# Patient Record
Sex: MALE | Race: WHITE | Employment: FULL TIME | URBAN - NONMETROPOLITAN AREA
[De-identification: names, ages, dates, MRNs, and addresses within clinical notes are randomized per-mention and may not be internally consistent; named-entity substitution may affect disease eponyms.]

---

## 2021-01-22 ENCOUNTER — APPOINTMENT (OUTPATIENT)
Dept: ULTRASOUND IMAGING | Age: 26
End: 2021-01-22
Payer: COMMERCIAL

## 2021-01-22 ENCOUNTER — HOSPITAL ENCOUNTER (OUTPATIENT)
Age: 26
Setting detail: OBSERVATION
Discharge: HOME | End: 2021-01-24
Attending: EMERGENCY MEDICINE | Admitting: FAMILY MEDICINE
Payer: COMMERCIAL

## 2021-01-22 ENCOUNTER — APPOINTMENT (OUTPATIENT)
Dept: CT IMAGING | Age: 26
End: 2021-01-22
Payer: COMMERCIAL

## 2021-01-22 DIAGNOSIS — K81.9 CHOLECYSTITIS: ICD-10-CM

## 2021-01-22 DIAGNOSIS — R10.84 GENERALIZED ABDOMINAL PAIN: ICD-10-CM

## 2021-01-22 DIAGNOSIS — K80.50 BILIARY COLIC: Primary | ICD-10-CM

## 2021-01-22 DIAGNOSIS — K80.00 CALCULUS OF GALLBLADDER WITH ACUTE CHOLECYSTITIS WITHOUT OBSTRUCTION: ICD-10-CM

## 2021-01-22 PROBLEM — R10.9 ABDOMINAL PAIN: Status: ACTIVE | Noted: 2021-01-22

## 2021-01-22 LAB
ALBUMIN SERPL-MCNC: 4.2 G/DL (ref 3.5–5.1)
ALP BLD-CCNC: 74 U/L (ref 38–126)
ALT SERPL-CCNC: 35 U/L (ref 11–66)
ANION GAP SERPL CALCULATED.3IONS-SCNC: 9 MEQ/L (ref 8–16)
AST SERPL-CCNC: 27 U/L (ref 5–40)
BACTERIA: ABNORMAL /HPF
BASOPHILS # BLD: 0.2 %
BASOPHILS ABSOLUTE: 0 THOU/MM3 (ref 0–0.1)
BILIRUB SERPL-MCNC: 0.7 MG/DL (ref 0.3–1.2)
BILIRUBIN DIRECT: < 0.2 MG/DL (ref 0–0.3)
BILIRUBIN URINE: NEGATIVE
BLOOD, URINE: NEGATIVE
BUN BLDV-MCNC: 10 MG/DL (ref 7–22)
CALCIUM SERPL-MCNC: 8.7 MG/DL (ref 8.5–10.5)
CASTS 2: ABNORMAL /LPF
CASTS UA: ABNORMAL /LPF
CHARACTER, URINE: CLEAR
CHLORIDE BLD-SCNC: 101 MEQ/L (ref 98–111)
CO2: 26 MEQ/L (ref 23–33)
COLOR: YELLOW
CREAT SERPL-MCNC: 0.7 MG/DL (ref 0.4–1.2)
CRYSTALS, UA: ABNORMAL
EOSINOPHIL # BLD: 0.1 %
EOSINOPHILS ABSOLUTE: 0 THOU/MM3 (ref 0–0.4)
EPITHELIAL CELLS, UA: ABNORMAL /HPF
ERYTHROCYTE [DISTWIDTH] IN BLOOD BY AUTOMATED COUNT: 12.9 % (ref 11.5–14.5)
ERYTHROCYTE [DISTWIDTH] IN BLOOD BY AUTOMATED COUNT: 39.5 FL (ref 35–45)
GFR SERPL CREATININE-BSD FRML MDRD: > 90 ML/MIN/1.73M2
GLUCOSE BLD-MCNC: 90 MG/DL (ref 70–108)
GLUCOSE URINE: NEGATIVE MG/DL
HCT VFR BLD CALC: 47.1 % (ref 42–52)
HEMOGLOBIN: 15.9 GM/DL (ref 14–18)
IMMATURE GRANS (ABS): 0.12 THOU/MM3 (ref 0–0.07)
IMMATURE GRANULOCYTES: 0.6 %
INR BLD: 1.17 (ref 0.85–1.13)
KETONES, URINE: NEGATIVE
LACTIC ACID: 0.8 MMOL/L (ref 0.5–2.2)
LEUKOCYTE ESTERASE, URINE: NEGATIVE
LIPASE: 17 U/L (ref 5.6–51.3)
LYMPHOCYTES # BLD: 8.9 %
LYMPHOCYTES ABSOLUTE: 1.9 THOU/MM3 (ref 1–4.8)
MCH RBC QN AUTO: 28.9 PG (ref 26–33)
MCHC RBC AUTO-ENTMCNC: 33.8 GM/DL (ref 32.2–35.5)
MCV RBC AUTO: 85.5 FL (ref 80–94)
MISCELLANEOUS 2: ABNORMAL
MONOCYTES # BLD: 7.1 %
MONOCYTES ABSOLUTE: 1.5 THOU/MM3 (ref 0.4–1.3)
NITRITE, URINE: NEGATIVE
NUCLEATED RED BLOOD CELLS: 0 /100 WBC
OSMOLALITY CALCULATION: 270.5 MOSMOL/KG (ref 275–300)
PH UA: 8.5 (ref 5–9)
PLATELET # BLD: 224 THOU/MM3 (ref 130–400)
PMV BLD AUTO: 10.1 FL (ref 9.4–12.4)
POTASSIUM SERPL-SCNC: 3.9 MEQ/L (ref 3.5–5.2)
PROCALCITONIN: 0.14 NG/ML (ref 0.01–0.09)
PROTEIN UA: ABNORMAL
RBC # BLD: 5.51 MILL/MM3 (ref 4.7–6.1)
RBC URINE: ABNORMAL /HPF
REASON FOR REJECTION: NORMAL
REJECTED TEST: NORMAL
RENAL EPITHELIAL, UA: ABNORMAL
SEG NEUTROPHILS: 83.1 %
SEGMENTED NEUTROPHILS ABSOLUTE COUNT: 17.6 THOU/MM3 (ref 1.8–7.7)
SODIUM BLD-SCNC: 136 MEQ/L (ref 135–145)
SPECIFIC GRAVITY, URINE: 1.02 (ref 1–1.03)
TOTAL PROTEIN: 6.9 G/DL (ref 6.1–8)
UROBILINOGEN, URINE: 1 EU/DL (ref 0–1)
WBC # BLD: 21.2 THOU/MM3 (ref 4.8–10.8)
WBC UA: ABNORMAL /HPF
YEAST: ABNORMAL

## 2021-01-22 PROCEDURE — 80053 COMPREHEN METABOLIC PANEL: CPT

## 2021-01-22 PROCEDURE — 6360000002 HC RX W HCPCS: Performed by: FAMILY MEDICINE

## 2021-01-22 PROCEDURE — 3209999900 CT INTERPRETATION OF OUTSIDE IMAGES

## 2021-01-22 PROCEDURE — 6370000000 HC RX 637 (ALT 250 FOR IP): Performed by: EMERGENCY MEDICINE

## 2021-01-22 PROCEDURE — 83690 ASSAY OF LIPASE: CPT

## 2021-01-22 PROCEDURE — 85025 COMPLETE CBC W/AUTO DIFF WBC: CPT

## 2021-01-22 PROCEDURE — G0378 HOSPITAL OBSERVATION PER HR: HCPCS

## 2021-01-22 PROCEDURE — 76705 ECHO EXAM OF ABDOMEN: CPT

## 2021-01-22 PROCEDURE — 96375 TX/PRO/DX INJ NEW DRUG ADDON: CPT

## 2021-01-22 PROCEDURE — 84145 PROCALCITONIN (PCT): CPT

## 2021-01-22 PROCEDURE — 96376 TX/PRO/DX INJ SAME DRUG ADON: CPT

## 2021-01-22 PROCEDURE — 81001 URINALYSIS AUTO W/SCOPE: CPT

## 2021-01-22 PROCEDURE — 96374 THER/PROPH/DIAG INJ IV PUSH: CPT

## 2021-01-22 PROCEDURE — 36415 COLL VENOUS BLD VENIPUNCTURE: CPT

## 2021-01-22 PROCEDURE — 99220 PR INITIAL OBSERVATION CARE/DAY 70 MINUTES: CPT | Performed by: FAMILY MEDICINE

## 2021-01-22 PROCEDURE — 2580000003 HC RX 258: Performed by: FAMILY MEDICINE

## 2021-01-22 PROCEDURE — 2580000003 HC RX 258: Performed by: EMERGENCY MEDICINE

## 2021-01-22 PROCEDURE — 99284 EMERGENCY DEPT VISIT MOD MDM: CPT

## 2021-01-22 PROCEDURE — 83605 ASSAY OF LACTIC ACID: CPT

## 2021-01-22 PROCEDURE — 82248 BILIRUBIN DIRECT: CPT

## 2021-01-22 PROCEDURE — 96365 THER/PROPH/DIAG IV INF INIT: CPT

## 2021-01-22 PROCEDURE — 6360000002 HC RX W HCPCS: Performed by: EMERGENCY MEDICINE

## 2021-01-22 PROCEDURE — 85610 PROTHROMBIN TIME: CPT

## 2021-01-22 PROCEDURE — 87040 BLOOD CULTURE FOR BACTERIA: CPT

## 2021-01-22 RX ORDER — CEFAZOLIN SODIUM 1 G/50ML
1000 INJECTION, SOLUTION INTRAVENOUS EVERY 8 HOURS
Status: DISCONTINUED | OUTPATIENT
Start: 2021-01-22 | End: 2021-01-23

## 2021-01-22 RX ORDER — MORPHINE SULFATE 4 MG/ML
4 INJECTION, SOLUTION INTRAMUSCULAR; INTRAVENOUS ONCE
Status: COMPLETED | OUTPATIENT
Start: 2021-01-22 | End: 2021-01-22

## 2021-01-22 RX ORDER — SODIUM CHLORIDE 0.9 % (FLUSH) 0.9 %
10 SYRINGE (ML) INJECTION EVERY 12 HOURS SCHEDULED
Status: DISCONTINUED | OUTPATIENT
Start: 2021-01-22 | End: 2021-01-24 | Stop reason: HOSPADM

## 2021-01-22 RX ORDER — TRAZODONE HYDROCHLORIDE 50 MG/1
50 TABLET ORAL NIGHTLY
COMMUNITY

## 2021-01-22 RX ORDER — ESOMEPRAZOLE MAGNESIUM 40 MG/1
40 FOR SUSPENSION ORAL DAILY
COMMUNITY

## 2021-01-22 RX ORDER — ESCITALOPRAM OXALATE 20 MG/1
10 TABLET ORAL DAILY
COMMUNITY

## 2021-01-22 RX ORDER — 0.9 % SODIUM CHLORIDE 0.9 %
500 INTRAVENOUS SOLUTION INTRAVENOUS ONCE
Status: COMPLETED | OUTPATIENT
Start: 2021-01-22 | End: 2021-01-22

## 2021-01-22 RX ORDER — PANTOPRAZOLE SODIUM 40 MG/10ML
40 INJECTION, POWDER, LYOPHILIZED, FOR SOLUTION INTRAVENOUS DAILY
Status: DISCONTINUED | OUTPATIENT
Start: 2021-01-23 | End: 2021-01-22

## 2021-01-22 RX ORDER — ACETAMINOPHEN 325 MG/1
650 TABLET ORAL EVERY 4 HOURS PRN
Status: DISCONTINUED | OUTPATIENT
Start: 2021-01-22 | End: 2021-01-24 | Stop reason: HOSPADM

## 2021-01-22 RX ORDER — MORPHINE SULFATE 2 MG/ML
2 INJECTION, SOLUTION INTRAMUSCULAR; INTRAVENOUS EVERY 4 HOURS PRN
Status: DISCONTINUED | OUTPATIENT
Start: 2021-01-22 | End: 2021-01-23

## 2021-01-22 RX ORDER — PANTOPRAZOLE SODIUM 40 MG/1
40 TABLET, DELAYED RELEASE ORAL
Status: DISCONTINUED | OUTPATIENT
Start: 2021-01-23 | End: 2021-01-24 | Stop reason: HOSPADM

## 2021-01-22 RX ORDER — BUPROPION HYDROCHLORIDE 150 MG/1
150 TABLET ORAL EVERY MORNING
COMMUNITY

## 2021-01-22 RX ORDER — BUPROPION HYDROCHLORIDE 150 MG/1
150 TABLET ORAL EVERY MORNING
Status: DISCONTINUED | OUTPATIENT
Start: 2021-01-23 | End: 2021-01-24 | Stop reason: HOSPADM

## 2021-01-22 RX ORDER — SODIUM CHLORIDE 9 MG/ML
INJECTION, SOLUTION INTRAVENOUS CONTINUOUS
Status: DISCONTINUED | OUTPATIENT
Start: 2021-01-22 | End: 2021-01-24 | Stop reason: HOSPADM

## 2021-01-22 RX ORDER — SODIUM CHLORIDE 0.9 % (FLUSH) 0.9 %
10 SYRINGE (ML) INJECTION PRN
Status: DISCONTINUED | OUTPATIENT
Start: 2021-01-22 | End: 2021-01-22

## 2021-01-22 RX ORDER — SODIUM CHLORIDE 0.9 % (FLUSH) 0.9 %
10 SYRINGE (ML) INJECTION PRN
Status: DISCONTINUED | OUTPATIENT
Start: 2021-01-22 | End: 2021-01-24 | Stop reason: HOSPADM

## 2021-01-22 RX ORDER — MORPHINE SULFATE 4 MG/ML
4 INJECTION, SOLUTION INTRAMUSCULAR; INTRAVENOUS EVERY 4 HOURS PRN
Status: DISCONTINUED | OUTPATIENT
Start: 2021-01-22 | End: 2021-01-23

## 2021-01-22 RX ORDER — TRAZODONE HYDROCHLORIDE 50 MG/1
50 TABLET ORAL NIGHTLY
Status: DISCONTINUED | OUTPATIENT
Start: 2021-01-22 | End: 2021-01-24 | Stop reason: HOSPADM

## 2021-01-22 RX ORDER — ONDANSETRON 2 MG/ML
4 INJECTION INTRAMUSCULAR; INTRAVENOUS ONCE
Status: COMPLETED | OUTPATIENT
Start: 2021-01-22 | End: 2021-01-22

## 2021-01-22 RX ORDER — ONDANSETRON 2 MG/ML
4 INJECTION INTRAMUSCULAR; INTRAVENOUS EVERY 6 HOURS PRN
Status: DISCONTINUED | OUTPATIENT
Start: 2021-01-22 | End: 2021-01-24 | Stop reason: HOSPADM

## 2021-01-22 RX ORDER — ESCITALOPRAM OXALATE 10 MG/1
10 TABLET ORAL DAILY
Status: DISCONTINUED | OUTPATIENT
Start: 2021-01-23 | End: 2021-01-24 | Stop reason: HOSPADM

## 2021-01-22 RX ORDER — 0.9 % SODIUM CHLORIDE 0.9 %
1000 INTRAVENOUS SOLUTION INTRAVENOUS ONCE
Status: COMPLETED | OUTPATIENT
Start: 2021-01-22 | End: 2021-01-23

## 2021-01-22 RX ORDER — PANTOPRAZOLE SODIUM 40 MG/10ML
40 INJECTION, POWDER, LYOPHILIZED, FOR SOLUTION INTRAVENOUS DAILY
Status: DISCONTINUED | OUTPATIENT
Start: 2021-01-23 | End: 2021-01-22 | Stop reason: SDUPTHER

## 2021-01-22 RX ORDER — PANTOPRAZOLE SODIUM 40 MG/10ML
40 INJECTION, POWDER, LYOPHILIZED, FOR SOLUTION INTRAVENOUS ONCE
Status: DISCONTINUED | OUTPATIENT
Start: 2021-01-22 | End: 2021-01-22

## 2021-01-22 RX ORDER — SODIUM CHLORIDE 0.9 % (FLUSH) 0.9 %
10 SYRINGE (ML) INJECTION EVERY 12 HOURS SCHEDULED
Status: DISCONTINUED | OUTPATIENT
Start: 2021-01-22 | End: 2021-01-22

## 2021-01-22 RX ORDER — PANTOPRAZOLE SODIUM 40 MG/1
40 TABLET, DELAYED RELEASE ORAL ONCE
Status: COMPLETED | OUTPATIENT
Start: 2021-01-22 | End: 2021-01-22

## 2021-01-22 RX ORDER — PROMETHAZINE HYDROCHLORIDE 25 MG/1
12.5 TABLET ORAL EVERY 6 HOURS PRN
Status: DISCONTINUED | OUTPATIENT
Start: 2021-01-22 | End: 2021-01-24 | Stop reason: HOSPADM

## 2021-01-22 RX ADMIN — CEFAZOLIN SODIUM 1000 MG: 1 INJECTION, SOLUTION INTRAVENOUS at 22:55

## 2021-01-22 RX ADMIN — SODIUM CHLORIDE 1000 ML: 9 INJECTION, SOLUTION INTRAVENOUS at 22:54

## 2021-01-22 RX ADMIN — SODIUM CHLORIDE, PRESERVATIVE FREE 10 ML: 5 INJECTION INTRAVENOUS at 22:56

## 2021-01-22 RX ADMIN — MORPHINE SULFATE 2 MG: 2 INJECTION, SOLUTION INTRAMUSCULAR; INTRAVENOUS at 22:55

## 2021-01-22 RX ADMIN — MORPHINE SULFATE 4 MG: 4 INJECTION, SOLUTION INTRAMUSCULAR; INTRAVENOUS at 18:40

## 2021-01-22 RX ADMIN — PANTOPRAZOLE SODIUM 40 MG: 40 TABLET, DELAYED RELEASE ORAL at 18:22

## 2021-01-22 RX ADMIN — SODIUM CHLORIDE: 9 INJECTION, SOLUTION INTRAVENOUS at 18:22

## 2021-01-22 RX ADMIN — SODIUM CHLORIDE 500 ML: 9 INJECTION, SOLUTION INTRAVENOUS at 17:32

## 2021-01-22 RX ADMIN — ONDANSETRON 4 MG: 2 INJECTION INTRAMUSCULAR; INTRAVENOUS at 17:32

## 2021-01-22 ASSESSMENT — PAIN DESCRIPTION - FREQUENCY: FREQUENCY: CONTINUOUS

## 2021-01-22 ASSESSMENT — PAIN DESCRIPTION - ORIENTATION
ORIENTATION: LOWER
ORIENTATION: LOWER

## 2021-01-22 ASSESSMENT — ENCOUNTER SYMPTOMS
RHINORRHEA: 0
BACK PAIN: 0
COUGH: 0
WHEEZING: 0
NAUSEA: 1
TROUBLE SWALLOWING: 0
VOICE CHANGE: 0
CONSTIPATION: 0
CHEST TIGHTNESS: 0
SINUS PRESSURE: 0
VOMITING: 1
DIARRHEA: 0
SHORTNESS OF BREATH: 0
ABDOMINAL PAIN: 1
SORE THROAT: 0

## 2021-01-22 ASSESSMENT — PAIN DESCRIPTION - PAIN TYPE
TYPE: ACUTE PAIN
TYPE: ACUTE PAIN

## 2021-01-22 ASSESSMENT — PAIN SCALES - GENERAL
PAINLEVEL_OUTOF10: 6
PAINLEVEL_OUTOF10: 8

## 2021-01-22 NOTE — ED PROVIDER NOTES
[facility]        CHIEF COMPLAINT    Chief Complaint   Patient presents with    Abdominal Pain       Nurses Notes reviewed and I agree except as noted in the HPI. HPI    Cheryle Nasuti is a 30 y.o. male who presents for evaluation of gastric and upper abdominal pain since last night. The patient went to [facility] urgency room and was evaluated had a series of tests including CT scan however he continued to have pain epigastric area and went to her his family physician who was concerned about biliary colic and was sent here to be evaluated. Denies any fever, no chills, no nausea however has been vomiting every 5 minutes. Patient also had diarrhea last night 1 time. Patient admits that she is having this problem since March 2020. Denies use of marijuana denies any heartburn or peptic ulcer disease no GERD      REVIEW OF SYSTEMS    Review of Systems   Constitutional: Negative for appetite change, chills, diaphoresis, fatigue and fever. HENT: Negative for congestion, ear discharge, ear pain, postnasal drip, rhinorrhea, sinus pressure, sore throat, trouble swallowing and voice change. Respiratory: Negative for cough, chest tightness, shortness of breath and wheezing. Cardiovascular: Negative for chest pain, palpitations and leg swelling. Gastrointestinal: Positive for abdominal pain, nausea and vomiting. Negative for constipation and diarrhea. Musculoskeletal: Negative for arthralgias, back pain, joint swelling, myalgias, neck pain and neck stiffness. Skin: Negative for rash. Neurological: Negative for dizziness, syncope, weakness, light-headedness, numbness and headaches. PAST MEDICAL HISTORY     has no past medical history on file. SURGICAL HISTORY   has no past surgical history on file.     CURRENT MEDICATIONS    Previous Medications    BUPROPION (WELLBUTRIN XL) 150 MG EXTENDED RELEASE TABLET    Take 150 mg by mouth every morning    ESCITALOPRAM (LEXAPRO) 20 MG TABLET    Take 10 mg by mouth daily    ESOMEPRAZOLE MAGNESIUM (NEXIUM) 40 MG PACK    Take 40 mg by mouth daily    TRAZODONE (DESYREL) 50 MG TABLET    Take 50 mg by mouth nightly       ALLERGIES    is allergic to percocet [oxycodone-acetaminophen]. FAMILY HISTORY    has no family status information on file. family history is not on file. SOCIAL HISTORY     reports that he has never smoked. He has never used smokeless tobacco. He reports that he does not use drugs. PHYSICAL EXAM      INITIAL VITALS: BP (!) 153/83   Pulse 103   Temp 98.3 °F (36.8 °C)   Resp 18   Ht 5' 10\" (1.778 m)   Wt 220 lb (99.8 kg)   SpO2 94%   BMI 31.57 kg/m² Estimated body mass index is 31.57 kg/m² as calculated from the following:    Height as of this encounter: 5' 10\" (1.778 m). Weight as of this encounter: 220 lb (99.8 kg). Physical Exam  Vitals signs reviewed. Constitutional:       Appearance: He is well-developed. HENT:      Head: Normocephalic and atraumatic. Right Ear: External ear normal.      Left Ear: External ear normal.      Nose: Nose normal.   Eyes:      General: No scleral icterus. Conjunctiva/sclera: Conjunctivae normal.      Pupils: Pupils are equal, round, and reactive to light. Neck:      Musculoskeletal: Normal range of motion and neck supple. Thyroid: No thyromegaly. Vascular: No JVD. Cardiovascular:      Rate and Rhythm: Normal rate and regular rhythm. Heart sounds: No murmur. No friction rub. Pulmonary:      Effort: Pulmonary effort is normal.      Breath sounds: Normal breath sounds. No wheezing or rales. Chest:      Chest wall: No tenderness. Abdominal:      General: Bowel sounds are normal.      Palpations: Abdomen is soft. There is no mass. Tenderness: There is abdominal tenderness in the right upper quadrant, epigastric area and left upper quadrant.  There is right CVA tenderness and left CVA tenderness. There is no rebound. Negative signs include Rovsing's sign and McBurney's sign. Lymphadenopathy:      Cervical: No cervical adenopathy. Skin:     Findings: No rash. Neurological:      Mental Status: He is alert and oriented to person, place, and time. Psychiatric:         Behavior: Behavior is cooperative. MEDICAL DECISION MAKING    DIFFERENTIAL DIAGNOSIS:  Upper abdominal pain, biliary colic, gallstone, cholecystitis, GERD gastritis      DIAGNOSTIC RESULTS      RADIOLOGY:  I have reviewed radiologic plain film image(s). The plain films will be read or overread by the radiologist.All other non-plain film images(s) such as CT, Ultrasound and MRI have been read by the radiologist.  Nina Melena    (Results Pending)       LABS:   Labs Reviewed   CBC WITH AUTO DIFFERENTIAL   BASIC METABOLIC PANEL   HEPATIC FUNCTION PANEL   LIPASE   URINE RT REFLEX TO CULTURE     All other unresulted laboratory test above are normal:    Vitals:    Vitals:    01/22/21 1636   BP: (!) 153/83   Pulse: 103   Resp: 18   Temp: 98.3 °F (36.8 °C)   SpO2: 94%   Weight: 220 lb (99.8 kg)   Height: 5' 10\" (1.778 m)       EMERGENCY DEPARTMENT COURSE:    Medications   0.9 % sodium chloride infusion (has no administration in time range)   0.9 % sodium chloride bolus (500 mLs Intravenous New Bag 1/22/21 1732)   pantoprazole (PROTONIX) injection 40 mg (has no administration in time range)   ondansetron (ZOFRAN) injection 4 mg (4 mg Intravenous Given 1/22/21 1732)       The pt was seen and evaluated by me. Within the department, I observed the pt's vitalsigns to be within acceptable range . Laboratory and Radiological studies were requested. Within the department, the pt was treated with IV fluid hydration, Protonix, Zofran. 18:00 PM signed out to Dr Herminia Adame:   None. CONSULTS:  None    PROCEDURES:  None. FINAL IMPRESSION     No diagnosis found.       DISPOSITION/PLAN  PATIENT REFERRED TO:  No

## 2021-01-22 NOTE — ED TRIAGE NOTES
Patient to ED with complaints of mid abdominal pain that has intermittently been going on since last march. Patient states pain began around 11pm last night and has not subsided. Patient states the pain usually doesn't last this long. Patient denies drug or alcohol use. Patient resting on cot, respirations unlabored.  Family at bedside

## 2021-01-23 ENCOUNTER — ANESTHESIA EVENT (OUTPATIENT)
Dept: OPERATING ROOM | Age: 26
End: 2021-01-23
Payer: COMMERCIAL

## 2021-01-23 ENCOUNTER — ANESTHESIA (OUTPATIENT)
Dept: OPERATING ROOM | Age: 26
End: 2021-01-23
Payer: COMMERCIAL

## 2021-01-23 VITALS — SYSTOLIC BLOOD PRESSURE: 119 MMHG | DIASTOLIC BLOOD PRESSURE: 58 MMHG | TEMPERATURE: 99.9 F | OXYGEN SATURATION: 94 %

## 2021-01-23 LAB
ALBUMIN SERPL-MCNC: 3.8 G/DL (ref 3.5–5.1)
ALP BLD-CCNC: 72 U/L (ref 38–126)
ALT SERPL-CCNC: 43 U/L (ref 11–66)
ANION GAP SERPL CALCULATED.3IONS-SCNC: 7 MEQ/L (ref 8–16)
AST SERPL-CCNC: 33 U/L (ref 5–40)
BASOPHILS # BLD: 0.3 %
BASOPHILS ABSOLUTE: 0 THOU/MM3 (ref 0–0.1)
BILIRUB SERPL-MCNC: 0.6 MG/DL (ref 0.3–1.2)
BUN BLDV-MCNC: 9 MG/DL (ref 7–22)
CALCIUM SERPL-MCNC: 8.8 MG/DL (ref 8.5–10.5)
CHLORIDE BLD-SCNC: 102 MEQ/L (ref 98–111)
CO2: 27 MEQ/L (ref 23–33)
CREAT SERPL-MCNC: 1 MG/DL (ref 0.4–1.2)
EOSINOPHIL # BLD: 0.8 %
EOSINOPHILS ABSOLUTE: 0.1 THOU/MM3 (ref 0–0.4)
ERYTHROCYTE [DISTWIDTH] IN BLOOD BY AUTOMATED COUNT: 13 % (ref 11.5–14.5)
ERYTHROCYTE [DISTWIDTH] IN BLOOD BY AUTOMATED COUNT: 41.7 FL (ref 35–45)
GFR SERPL CREATININE-BSD FRML MDRD: 89 ML/MIN/1.73M2
GLUCOSE BLD-MCNC: 108 MG/DL (ref 70–108)
HCT VFR BLD CALC: 42.9 % (ref 42–52)
HEMOGLOBIN: 14.3 GM/DL (ref 14–18)
IMMATURE GRANS (ABS): 0.08 THOU/MM3 (ref 0–0.07)
IMMATURE GRANULOCYTES: 0.5 %
LYMPHOCYTES # BLD: 12.8 %
LYMPHOCYTES ABSOLUTE: 2 THOU/MM3 (ref 1–4.8)
MCH RBC QN AUTO: 29.2 PG (ref 26–33)
MCHC RBC AUTO-ENTMCNC: 33.3 GM/DL (ref 32.2–35.5)
MCV RBC AUTO: 87.7 FL (ref 80–94)
MONOCYTES # BLD: 8.6 %
MONOCYTES ABSOLUTE: 1.4 THOU/MM3 (ref 0.4–1.3)
NUCLEATED RED BLOOD CELLS: 0 /100 WBC
PLATELET # BLD: 183 THOU/MM3 (ref 130–400)
PMV BLD AUTO: 9.9 FL (ref 9.4–12.4)
POTASSIUM REFLEX MAGNESIUM: 4.7 MEQ/L (ref 3.5–5.2)
RBC # BLD: 4.89 MILL/MM3 (ref 4.7–6.1)
SARS-COV-2, NAAT: NOT DETECTED
SEG NEUTROPHILS: 77 %
SEGMENTED NEUTROPHILS ABSOLUTE COUNT: 12.2 THOU/MM3 (ref 1.8–7.7)
SODIUM BLD-SCNC: 136 MEQ/L (ref 135–145)
TOTAL PROTEIN: 6.6 G/DL (ref 6.1–8)
WBC # BLD: 15.8 THOU/MM3 (ref 4.8–10.8)

## 2021-01-23 PROCEDURE — 7100000001 HC PACU RECOVERY - ADDTL 15 MIN: Performed by: SURGERY

## 2021-01-23 PROCEDURE — 2500000003 HC RX 250 WO HCPCS: Performed by: SURGERY

## 2021-01-23 PROCEDURE — 2500000003 HC RX 250 WO HCPCS: Performed by: REGISTERED NURSE

## 2021-01-23 PROCEDURE — 6360000002 HC RX W HCPCS: Performed by: SURGERY

## 2021-01-23 PROCEDURE — 94761 N-INVAS EAR/PLS OXIMETRY MLT: CPT

## 2021-01-23 PROCEDURE — C1894 INTRO/SHEATH, NON-LASER: HCPCS | Performed by: SURGERY

## 2021-01-23 PROCEDURE — 2580000003 HC RX 258: Performed by: FAMILY MEDICINE

## 2021-01-23 PROCEDURE — 2580000003 HC RX 258: Performed by: SURGERY

## 2021-01-23 PROCEDURE — 6360000002 HC RX W HCPCS: Performed by: PHYSICIAN ASSISTANT

## 2021-01-23 PROCEDURE — 7100000000 HC PACU RECOVERY - FIRST 15 MIN: Performed by: SURGERY

## 2021-01-23 PROCEDURE — 47562 LAPAROSCOPIC CHOLECYSTECTOMY: CPT | Performed by: SURGERY

## 2021-01-23 PROCEDURE — 3600000012 HC SURGERY LEVEL 2 ADDTL 15MIN: Performed by: SURGERY

## 2021-01-23 PROCEDURE — 36415 COLL VENOUS BLD VENIPUNCTURE: CPT

## 2021-01-23 PROCEDURE — 6360000002 HC RX W HCPCS: Performed by: FAMILY MEDICINE

## 2021-01-23 PROCEDURE — 3600000002 HC SURGERY LEVEL 2 BASE: Performed by: SURGERY

## 2021-01-23 PROCEDURE — 2720000010 HC SURG SUPPLY STERILE: Performed by: SURGERY

## 2021-01-23 PROCEDURE — 6370000000 HC RX 637 (ALT 250 FOR IP): Performed by: FAMILY MEDICINE

## 2021-01-23 PROCEDURE — 3700000001 HC ADD 15 MINUTES (ANESTHESIA): Performed by: SURGERY

## 2021-01-23 PROCEDURE — 2709999900 HC NON-CHARGEABLE SUPPLY: Performed by: SURGERY

## 2021-01-23 PROCEDURE — 3700000000 HC ANESTHESIA ATTENDED CARE: Performed by: SURGERY

## 2021-01-23 PROCEDURE — 85025 COMPLETE CBC W/AUTO DIFF WBC: CPT

## 2021-01-23 PROCEDURE — 99225 PR SBSQ OBSERVATION CARE/DAY 25 MINUTES: CPT | Performed by: PHYSICIAN ASSISTANT

## 2021-01-23 PROCEDURE — 88304 TISSUE EXAM BY PATHOLOGIST: CPT

## 2021-01-23 PROCEDURE — G0378 HOSPITAL OBSERVATION PER HR: HCPCS

## 2021-01-23 PROCEDURE — 6370000000 HC RX 637 (ALT 250 FOR IP): Performed by: SURGERY

## 2021-01-23 PROCEDURE — 80053 COMPREHEN METABOLIC PANEL: CPT

## 2021-01-23 PROCEDURE — 2580000003 HC RX 258: Performed by: REGISTERED NURSE

## 2021-01-23 PROCEDURE — 6360000002 HC RX W HCPCS: Performed by: REGISTERED NURSE

## 2021-01-23 PROCEDURE — U0002 COVID-19 LAB TEST NON-CDC: HCPCS

## 2021-01-23 PROCEDURE — 99243 OFF/OP CNSLTJ NEW/EST LOW 30: CPT | Performed by: SURGERY

## 2021-01-23 RX ORDER — SODIUM CHLORIDE, SODIUM LACTATE, POTASSIUM CHLORIDE, CALCIUM CHLORIDE 600; 310; 30; 20 MG/100ML; MG/100ML; MG/100ML; MG/100ML
INJECTION, SOLUTION INTRAVENOUS CONTINUOUS PRN
Status: DISCONTINUED | OUTPATIENT
Start: 2021-01-23 | End: 2021-01-23 | Stop reason: SDUPTHER

## 2021-01-23 RX ORDER — PROPOFOL 10 MG/ML
INJECTION, EMULSION INTRAVENOUS PRN
Status: DISCONTINUED | OUTPATIENT
Start: 2021-01-23 | End: 2021-01-23 | Stop reason: SDUPTHER

## 2021-01-23 RX ORDER — LIDOCAINE HCL/PF 100 MG/5ML
SYRINGE (ML) INJECTION PRN
Status: DISCONTINUED | OUTPATIENT
Start: 2021-01-23 | End: 2021-01-23 | Stop reason: SDUPTHER

## 2021-01-23 RX ORDER — BUPIVACAINE HYDROCHLORIDE 5 MG/ML
INJECTION, SOLUTION PERINEURAL PRN
Status: DISCONTINUED | OUTPATIENT
Start: 2021-01-23 | End: 2021-01-23 | Stop reason: HOSPADM

## 2021-01-23 RX ORDER — HYDROCODONE BITARTRATE AND ACETAMINOPHEN 5; 325 MG/1; MG/1
1 TABLET ORAL EVERY 4 HOURS PRN
Status: DISCONTINUED | OUTPATIENT
Start: 2021-01-23 | End: 2021-01-24 | Stop reason: HOSPADM

## 2021-01-23 RX ORDER — GLYCOPYRROLATE 1 MG/5 ML
SYRINGE (ML) INTRAVENOUS PRN
Status: DISCONTINUED | OUTPATIENT
Start: 2021-01-23 | End: 2021-01-23 | Stop reason: SDUPTHER

## 2021-01-23 RX ORDER — MORPHINE SULFATE 2 MG/ML
2 INJECTION, SOLUTION INTRAMUSCULAR; INTRAVENOUS
Status: DISCONTINUED | OUTPATIENT
Start: 2021-01-23 | End: 2021-01-23

## 2021-01-23 RX ORDER — HYDROCODONE BITARTRATE AND ACETAMINOPHEN 5; 325 MG/1; MG/1
2 TABLET ORAL EVERY 4 HOURS PRN
Status: DISCONTINUED | OUTPATIENT
Start: 2021-01-23 | End: 2021-01-24 | Stop reason: HOSPADM

## 2021-01-23 RX ORDER — FENTANYL CITRATE 50 UG/ML
INJECTION, SOLUTION INTRAMUSCULAR; INTRAVENOUS PRN
Status: DISCONTINUED | OUTPATIENT
Start: 2021-01-23 | End: 2021-01-23 | Stop reason: SDUPTHER

## 2021-01-23 RX ORDER — MORPHINE SULFATE 4 MG/ML
4 INJECTION, SOLUTION INTRAMUSCULAR; INTRAVENOUS
Status: DISCONTINUED | OUTPATIENT
Start: 2021-01-23 | End: 2021-01-23

## 2021-01-23 RX ORDER — ROCURONIUM BROMIDE 10 MG/ML
INJECTION, SOLUTION INTRAVENOUS PRN
Status: DISCONTINUED | OUTPATIENT
Start: 2021-01-23 | End: 2021-01-23 | Stop reason: SDUPTHER

## 2021-01-23 RX ORDER — SUCCINYLCHOLINE/SOD CL,ISO/PF 200MG/10ML
SYRINGE (ML) INTRAVENOUS PRN
Status: DISCONTINUED | OUTPATIENT
Start: 2021-01-23 | End: 2021-01-23 | Stop reason: SDUPTHER

## 2021-01-23 RX ORDER — ONDANSETRON 2 MG/ML
INJECTION INTRAMUSCULAR; INTRAVENOUS PRN
Status: DISCONTINUED | OUTPATIENT
Start: 2021-01-23 | End: 2021-01-23 | Stop reason: SDUPTHER

## 2021-01-23 RX ORDER — MIDAZOLAM HYDROCHLORIDE 1 MG/ML
INJECTION INTRAMUSCULAR; INTRAVENOUS PRN
Status: DISCONTINUED | OUTPATIENT
Start: 2021-01-23 | End: 2021-01-23 | Stop reason: SDUPTHER

## 2021-01-23 RX ORDER — ONDANSETRON 2 MG/ML
4 INJECTION INTRAMUSCULAR; INTRAVENOUS
Status: DISCONTINUED | OUTPATIENT
Start: 2021-01-23 | End: 2021-01-23 | Stop reason: HOSPADM

## 2021-01-23 RX ORDER — FENTANYL CITRATE 50 UG/ML
50 INJECTION, SOLUTION INTRAMUSCULAR; INTRAVENOUS EVERY 5 MIN PRN
Status: DISCONTINUED | OUTPATIENT
Start: 2021-01-23 | End: 2021-01-23 | Stop reason: HOSPADM

## 2021-01-23 RX ORDER — NEOSTIGMINE METHYLSULFATE 5 MG/5 ML
SYRINGE (ML) INTRAVENOUS PRN
Status: DISCONTINUED | OUTPATIENT
Start: 2021-01-23 | End: 2021-01-23 | Stop reason: SDUPTHER

## 2021-01-23 RX ORDER — DEXAMETHASONE SODIUM PHOSPHATE 10 MG/ML
INJECTION, EMULSION INTRAMUSCULAR; INTRAVENOUS PRN
Status: DISCONTINUED | OUTPATIENT
Start: 2021-01-23 | End: 2021-01-23 | Stop reason: SDUPTHER

## 2021-01-23 RX ORDER — MEPERIDINE HYDROCHLORIDE 25 MG/ML
12.5 INJECTION INTRAMUSCULAR; INTRAVENOUS; SUBCUTANEOUS EVERY 5 MIN PRN
Status: DISCONTINUED | OUTPATIENT
Start: 2021-01-23 | End: 2021-01-23 | Stop reason: HOSPADM

## 2021-01-23 RX ADMIN — BUPROPION HYDROCHLORIDE 150 MG: 150 TABLET, EXTENDED RELEASE ORAL at 08:55

## 2021-01-23 RX ADMIN — Medication 120 MG: at 18:27

## 2021-01-23 RX ADMIN — MORPHINE SULFATE 2 MG: 2 INJECTION, SOLUTION INTRAMUSCULAR; INTRAVENOUS at 09:01

## 2021-01-23 RX ADMIN — MORPHINE SULFATE 2 MG: 2 INJECTION, SOLUTION INTRAMUSCULAR; INTRAVENOUS at 14:22

## 2021-01-23 RX ADMIN — FENTANYL CITRATE 50 MCG: 50 INJECTION, SOLUTION INTRAMUSCULAR; INTRAVENOUS at 18:25

## 2021-01-23 RX ADMIN — SODIUM CHLORIDE, PRESERVATIVE FREE 10 ML: 5 INJECTION INTRAVENOUS at 21:08

## 2021-01-23 RX ADMIN — CEFAZOLIN SODIUM 1000 MG: 1 INJECTION, SOLUTION INTRAVENOUS at 06:59

## 2021-01-23 RX ADMIN — MORPHINE SULFATE 4 MG: 4 INJECTION, SOLUTION INTRAMUSCULAR; INTRAVENOUS at 20:12

## 2021-01-23 RX ADMIN — PANTOPRAZOLE SODIUM 40 MG: 40 TABLET, DELAYED RELEASE ORAL at 06:59

## 2021-01-23 RX ADMIN — MORPHINE SULFATE 2 MG: 2 INJECTION, SOLUTION INTRAMUSCULAR; INTRAVENOUS at 03:15

## 2021-01-23 RX ADMIN — FENTANYL CITRATE 50 MCG: 50 INJECTION, SOLUTION INTRAMUSCULAR; INTRAVENOUS at 18:40

## 2021-01-23 RX ADMIN — MIDAZOLAM HYDROCHLORIDE 2 MG: 1 INJECTION, SOLUTION INTRAMUSCULAR; INTRAVENOUS at 18:23

## 2021-01-23 RX ADMIN — PIPERACILLIN AND TAZOBACTAM 3375 MG: 3; .375 INJECTION, POWDER, LYOPHILIZED, FOR SOLUTION INTRAVENOUS at 18:03

## 2021-01-23 RX ADMIN — MORPHINE SULFATE 4 MG: 4 INJECTION, SOLUTION INTRAMUSCULAR; INTRAVENOUS at 01:01

## 2021-01-23 RX ADMIN — Medication 0.8 MG: at 19:22

## 2021-01-23 RX ADMIN — ROCURONIUM BROMIDE 30 MG: 10 INJECTION INTRAVENOUS at 18:34

## 2021-01-23 RX ADMIN — TRAZODONE HYDROCHLORIDE 50 MG: 50 TABLET ORAL at 21:09

## 2021-01-23 RX ADMIN — ROCURONIUM BROMIDE 10 MG: 10 INJECTION INTRAVENOUS at 18:49

## 2021-01-23 RX ADMIN — SODIUM CHLORIDE, POTASSIUM CHLORIDE, SODIUM LACTATE AND CALCIUM CHLORIDE: 600; 310; 30; 20 INJECTION, SOLUTION INTRAVENOUS at 19:05

## 2021-01-23 RX ADMIN — HYDROCODONE BITARTRATE AND ACETAMINOPHEN 2 TABLET: 5; 325 TABLET ORAL at 21:09

## 2021-01-23 RX ADMIN — PROPOFOL 200 MG: 10 INJECTION, EMULSION INTRAVENOUS at 18:27

## 2021-01-23 RX ADMIN — Medication 60 MG: at 18:27

## 2021-01-23 RX ADMIN — SODIUM CHLORIDE, PRESERVATIVE FREE 10 ML: 5 INJECTION INTRAVENOUS at 08:48

## 2021-01-23 RX ADMIN — MORPHINE SULFATE 2 MG: 2 INJECTION, SOLUTION INTRAMUSCULAR; INTRAVENOUS at 06:59

## 2021-01-23 RX ADMIN — Medication 5 MG: at 19:22

## 2021-01-23 RX ADMIN — MORPHINE SULFATE 2 MG: 2 INJECTION, SOLUTION INTRAMUSCULAR; INTRAVENOUS at 11:39

## 2021-01-23 RX ADMIN — HYDROMORPHONE HYDROCHLORIDE 0.5 MG: 1 INJECTION, SOLUTION INTRAMUSCULAR; INTRAVENOUS; SUBCUTANEOUS at 22:13

## 2021-01-23 RX ADMIN — ONDANSETRON HYDROCHLORIDE 4 MG: 4 INJECTION, SOLUTION INTRAMUSCULAR; INTRAVENOUS at 18:29

## 2021-01-23 RX ADMIN — ROCURONIUM BROMIDE 5 MG: 10 INJECTION INTRAVENOUS at 18:27

## 2021-01-23 RX ADMIN — DEXAMETHASONE SODIUM PHOSPHATE 8 MG: 10 INJECTION, EMULSION INTRAMUSCULAR; INTRAVENOUS at 18:29

## 2021-01-23 RX ADMIN — ESCITALOPRAM 10 MG: 10 TABLET, FILM COATED ORAL at 08:55

## 2021-01-23 ASSESSMENT — PULMONARY FUNCTION TESTS
PIF_VALUE: 24
PIF_VALUE: 32
PIF_VALUE: 34
PIF_VALUE: 21
PIF_VALUE: 24
PIF_VALUE: 0
PIF_VALUE: 35
PIF_VALUE: 33
PIF_VALUE: 4
PIF_VALUE: 23
PIF_VALUE: 33
PIF_VALUE: 34
PIF_VALUE: 21
PIF_VALUE: 24
PIF_VALUE: 19
PIF_VALUE: 24
PIF_VALUE: 33
PIF_VALUE: 1
PIF_VALUE: 28
PIF_VALUE: 24
PIF_VALUE: 31
PIF_VALUE: 32
PIF_VALUE: 21
PIF_VALUE: 27
PIF_VALUE: 34
PIF_VALUE: 4
PIF_VALUE: 28
PIF_VALUE: 32
PIF_VALUE: 1
PIF_VALUE: 30
PIF_VALUE: 32
PIF_VALUE: 2
PIF_VALUE: 34
PIF_VALUE: 29
PIF_VALUE: 26
PIF_VALUE: 2
PIF_VALUE: 22
PIF_VALUE: 1
PIF_VALUE: 23
PIF_VALUE: 31
PIF_VALUE: 3
PIF_VALUE: 30
PIF_VALUE: 22
PIF_VALUE: 21
PIF_VALUE: 22
PIF_VALUE: 23

## 2021-01-23 ASSESSMENT — PAIN SCALES - GENERAL
PAINLEVEL_OUTOF10: 4
PAINLEVEL_OUTOF10: 10
PAINLEVEL_OUTOF10: 3
PAINLEVEL_OUTOF10: 3
PAINLEVEL_OUTOF10: 4
PAINLEVEL_OUTOF10: 8
PAINLEVEL_OUTOF10: 8
PAINLEVEL_OUTOF10: 7

## 2021-01-23 NOTE — PROGRESS NOTES
Patient to go to surgery with Dr. Imani Lacy. For laproscopic possible open cholecystectomy. Consent signed.

## 2021-01-23 NOTE — H&P
History & Physical       Patient: Caryle Cecil  YOB: 1995    MRN: [id number]     Acct: [de-identified]    PCP: Leodan Chi    Date of Admission: 1/22/2021    Date of Service: Patient seen / examined on 01/22/21 and admitted to outpatient with expected LOS less than two midnights due to medical therapy. ASSESSMENT / PLAN:    Acute on chronic RUQ abdominal pain / concern for early acute calculous cholecystitis  Cholelithiasis, positive sonographic Snyder's on RUQ U/S. + Snyder's on physical exam. + Low-grade fever, mild tachycardia, leukocytosis. Procalcitonin negative. LA nml. Remains hemodynamically appropriate on RA. Admit to med/surg. NPO / IVF / ancef / analgesics/antiemetics as needed. General surgery consulted for ongoing evaluation / possible cholecystectomy during hospitalization. Maintain telemetry. GERD  PPI resumed. Chronic depression  SSRI, wellbutrin, trazodone resumed. Chief Complaint: abdominal pain    History of Present Illness:  31 y/o M PMHx GERD, depression -- presents to [facility] with a chief complaint of abdominal pain. History provided by the patient. Patient presents with complaints of RUQ abdominal pain / has been present and episodic in nature since 3/2020 / episodes are usually brief, with no known precipitating/alleviating factors (including fatty foods) / states pain started again this evening and persisted, prompting ED evaluation. Pain described as severe (10/10) / sharp/cramping / localized to RUQ without radiation. + Nausea / no emesis. No recent fevers/chills, headache, chest pain, palpitations, cough, shortness of breath, diarrhea or constipation. No history of abdominal surgeries. No additional questions / concerns identified at this time. ED course: febrile (Tmax 100.6 degrees), mildly hypertensive (150s/80s), mildly tachycardic (low 100s), RR nml with SpO2 94% on RA. Lab workup remarkable only for: WBC 21.1.  RUQ U/S: multiple gallstones (largest 1.5 cm) / positive sonographic Snyder's / top normal GB wall at 3 mm / CBD 6 mm. Received IVF, morphine, zofran, protonix. ED provider discussed case with general surgery -- no indication for immediate surgery; consider IP consult vs short-term OP follow up pending ongoing clinical course. Hospitalist service contacted for admission. Please see A&P for additional details. Past Medical History:  GERD  Depression    Past Surgical History:    History reviewed. No pertinent surgical history. Medications Prior to Admission:   Medication Sig    escitalopram (LEXAPRO) 20 MG tablet Take 10 mg by mouth daily    esomeprazole Magnesium (NEXIUM) 40 MG PACK Take 40 mg by mouth daily    traZODone (DESYREL) 50 MG tablet Take 50 mg by mouth nightly    buPROPion (WELLBUTRIN XL) 150 MG extended release tablet Take 150 mg by mouth every morning     Allergies:   Percocet [oxycodone-acetaminophen]    Social History:   Socioeconomic History    Marital status:    Tobacco Use    Smoking status: Never Smoker    Smokeless tobacco: Never Used   Substance and Sexual Activity    Alcohol use: Not on file     Comment: occ    Drug use: Never     Family History:    History reviewed. No pertinent family history. REVIEW OF SYSTEMS:  A 14-point ROS was obtained and negative, with the exception of pertinent positives as stated in the HPI. PHYSICAL EXAM:  Vitals:    01/22/21 1636 01/22/21 1751 01/22/21 1845 01/22/21 2000   BP: (!) 153/83 137/85 (!) 151/88 (!) 152/87   Pulse: 103 95 103 104   Resp: 18 19 18 18   Temp: 98.3 °F (36.8 °C)      SpO2: 94% 97% 97% 98%   Weight: 220 lb (99.8 kg)      Height: 5' 10\" (1.778 m)        General appearance: Alert / well-appearing  male. Pleasant. Cooperative. NAD. HEENT: Normocephalic / atraumatic. Conjunctivae appear normal.  Neck: Supple. No JVD. Respiratory: Unlabored on RA. CTAB. No wheezes / rales / rhonchi. Cardiovascular: Tachycardic rate.  Regular rhythm. Normal S1/S2. No murmurs / rubs / gallops. Abdomen: Soft / non-distended. Significant TTP noted to RUQ with + Snyder's. No rebound / guarding. BS present. Musculoskeletal: No cyanosis or edema. Skin: Warm / dry. Normal turgor. No pallor / diaphoresis / jaundice. Neurologic: A/O x 3. Speech normal. Answers questions appropriately. No obvious focal neurologic deficits. Psychiatric: Thought content / judgment / insight appear appropriate.     Labs:   Results for orders placed or performed during the hospital encounter of 01/22/21   CBC auto differential   Result Value Ref Range    WBC 21.2 (H) 4.8 - 10.8 thou/mm3    RBC 5.51 4.70 - 6.10 mill/mm3    Hemoglobin 15.9 14.0 - 18.0 gm/dl    Hematocrit 47.1 42.0 - 52.0 %    MCV 85.5 80.0 - 94.0 fL    MCH 28.9 26.0 - 33.0 pg    MCHC 33.8 32.2 - 35.5 gm/dl    RDW-CV 12.9 11.5 - 14.5 %    RDW-SD 39.5 35.0 - 45.0 fL    Platelets 638 354 - 272 thou/mm3    MPV 10.1 9.4 - 12.4 fL    Seg Neutrophils 83.1 %    Lymphocytes 8.9 %    Monocytes 7.1 %    Eosinophils 0.1 %    Basophils 0.2 %    Immature Granulocytes 0.6 %    Segs Absolute 17.6 (H) 1.8 - 7.7 thou/mm3    Lymphocytes Absolute 1.9 1.0 - 4.8 thou/mm3    Monocytes Absolute 1.5 (H) 0.4 - 1.3 thou/mm3    Eosinophils Absolute 0.0 0.0 - 0.4 thou/mm3    Basophils Absolute 0.0 0.0 - 0.1 thou/mm3    Immature Grans (Abs) 0.12 (H) 0.00 - 0.07 thou/mm3    nRBC 0 /100 wbc   SPECIMEN REJECTION   Result Value Ref Range    Rejected Test Chemistries     Reason for Rejection see below    Basic Metabolic Panel   Result Value Ref Range    Sodium 136 135 - 145 meq/L    Potassium 3.9 3.5 - 5.2 meq/L    Chloride 101 98 - 111 meq/L    CO2 26 23 - 33 meq/L    Glucose 90 70 - 108 mg/dL    BUN 10 7 - 22 mg/dL    CREATININE 0.7 0.4 - 1.2 mg/dL    Calcium 8.7 8.5 - 10.5 mg/dL   Hepatic Function Panel   Result Value Ref Range    Alb 4.2 3.5 - 5.1 g/dL    Total Bilirubin 0.7 0.3 - 1.2 mg/dL    Bilirubin, Direct <0.2 0.0 - 0.3 mg/dL    Alkaline Phosphatase 74 38 - 126 U/L    AST 27 5 - 40 U/L    ALT 35 11 - 66 U/L    Total Protein 6.9 6.1 - 8.0 g/dL   Lipase   Result Value Ref Range    Lipase 17.0 5.6 - 51.3 U/L   Anion Gap   Result Value Ref Range    Anion Gap 9.0 8.0 - 16.0 meq/L   Glomerular Filtration Rate, Estimated   Result Value Ref Range    Est, Glom Filt Rate >90 ml/min/1.73m2   Osmolality   Result Value Ref Range    Osmolality Calc 270.5 (L) 275.0 - 300.0 mOsmol/kg   Urine with Reflexed Micro   Result Value Ref Range    Glucose, Ur NEGATIVE NEGATIVE mg/dl    Bilirubin Urine NEGATIVE NEGATIVE    Ketones, Urine NEGATIVE NEGATIVE    Specific Gravity, Urine 1.017 1.002 - 1.030    Blood, Urine NEGATIVE NEGATIVE    pH, UA 8.5 5.0 - 9.0    Protein, UA TRACE (A) NEGATIVE    Urobilinogen, Urine 1.0 0.0 - 1.0 eu/dl    Nitrite, Urine NEGATIVE NEGATIVE    Leukocyte Esterase, Urine NEGATIVE NEGATIVE    Color, UA YELLOW STRAW-YELLOW    Character, Urine CLEAR CLEAR-SL CLOUD    RBC, UA 3-5 0-2/hpf /hpf    WBC, UA 0-2 0-4/hpf /hpf    Epithelial Cells, UA 0-2 3-5/hpf /hpf    Bacteria, UA NONE SEEN FEW/NONE SEEN /hpf    Casts UA NONE SEEN NONE SEEN /lpf    Crystals, UA NONE SEEN NONE SEEN    Renal Epithelial, UA NONE SEEN NONE SEEN    Yeast, UA NONE SEEN NONE SEEN    CASTS 2 NONE SEEN NONE SEEN /lpf    MISCELLANEOUS 2 NONE SEEN      EKG: none obtained    Radiology:     Us Gallbladder Ruq  Result Date: 1/22/2021  PROCEDURE: US GALLBLADDER RUQ CLINICAL INFORMATION: epigastric and upper abdominal pain. COMPARISON: No prior study. TECHNIQUE:Real-time transabdominal ultrasound was performed. FINDINGS: Multiple gallstones. Largest is 1.4 x 0.6 x 1.5 cm. Common bile duct is 6 mm. Top normal gallbladder wall at 3 mm. Correlation with serology is advised. Technologist reports positive sonographic Snyder's sign. IMPRESSION: Multiple gallstones, largest is 1.5 cm. Correlation with serology is advised. Technologist reports positive sonographic Snyder's sign.  Correlation advised. **This report has been created using voice recognition software. It may contain minor errors which are inherent in voice recognition technology. ** Final report electronically signed by Dr. Nadiya Beltran on 1/22/2021 6:26 PM    Ct Interpretation Of Outside Images  Result Date: 1/22/2021  PROCEDURE: CT INTERPRETATION OF OUTSIDE IMAGES CLINICAL INFORMATION: interpretation of outside images . COMPARISON: No prior study. TECHNIQUE: Axial CT images from the lung bases to the upper thighs with coronal and sagittal reformats. FINDINGS: Minimal atelectasis in the lung bases. Spleen pancreas adrenal glands gallbladder liver kidneys are unremarkable no hydronephrosis. Moderate scattered stool in the colon. No bowel wall thickening or obstruction. No bladder wall thickening. Appendix is not identified. Normal caliber abdominal aorta. Small fat-containing umbilical hernia. No acute osseous findings. 1. Moderate scattered stool in the colon. No bowel wall thickening or obstruction. **This report has been created using voice recognition software. It may contain minor errors which are inherent in voice recognition technology. ** Final report electronically signed by Dr. Nadiya Beltran on 1/22/2021 6:20 PM    CODE STATUS: FULL    Thank you Mercedes Hudsonyoselinmars for the opportunity to be involved in this patient's care.     Electronically signed by Coleman Marina MD on 1/22/2021

## 2021-01-23 NOTE — ED PROVIDER NOTES
Transfer of Care Note:   Physician Signing out: Charly Ag MD  Receiving Physician: César Summers M.D. Sign out time: 1800      Brief history:  Persistent right upper quadrant abdominal pain, previously seen at [facility][note 3], sent by PCP for evaluation here today. Work-up so far has shown significant leukocytosis, ultrasound is pending. Items pending that need to be checked:  Gallbladder ultrasound      Tentative Impression of patient:  1. Biliary colic  2. Rule out cholecystitis    Expected disposition of patient:  Pending results. Additional Assessment and results:   I have personally performed a face to face diagnostic evaluation on this patient. The patient's initial evaluation and plan have been discussed with the prior physician who initially evaluated the patient. Nursing Notes, Past Medical Hx, Past Surgical Hx, Social Hx, Allergies, vital signs and Family Hx were all reviewed. Vitals:    01/22/21 2000   BP: (!) 152/87   Pulse: 104   Resp: 18   Temp:    SpO2: 98%     Physical Exam  Appears comfortable, abdomen minimally tender on the right upper quadrant, positive Snyder sign.     Labs Reviewed   CBC WITH AUTO DIFFERENTIAL - Abnormal; Notable for the following components:       Result Value    WBC 21.2 (*)     Segs Absolute 17.6 (*)     Monocytes Absolute 1.5 (*)     Immature Grans (Abs) 0.12 (*)     All other components within normal limits   OSMOLALITY - Abnormal; Notable for the following components:    Osmolality Calc 270.5 (*)     All other components within normal limits   URINE WITH REFLEXED MICRO - Abnormal; Notable for the following components:    Protein, UA TRACE (*)     All other components within normal limits   SPECIMEN REJECTION   BASIC METABOLIC PANEL   HEPATIC FUNCTION PANEL   LIPASE   ANION GAP   GLOMERULAR FILTRATION RATE, ESTIMATED         Medications   0.9 % sodium chloride infusion ( Intravenous New Bag 1/22/21 3980)   0.9 % sodium chloride bolus (0 mLs Intravenous Stopped 1/22/21 1822)   ondansetron (ZOFRAN) injection 4 mg (4 mg Intravenous Given 1/22/21 1732)   pantoprazole (PROTONIX) tablet 40 mg (40 mg Oral Given 1/22/21 1822)   morphine injection 4 mg (4 mg Intravenous Given 1/22/21 1840)         CT INTERPRETATION OF OUTSIDE IMAGES   Final Result   1. Moderate scattered stool in the colon. No bowel wall thickening or obstruction. **This report has been created using voice recognition software. It may contain minor errors which are inherent in voice recognition technology. **      Final report electronically signed by Dr. Lisa Welch on 1/22/2021 6:20 PM      US GALLBLADDER RUQ   Final Result    IMPRESSION:       Multiple gallstones, largest is 1.5 cm. Correlation with serology is advised. Technologist reports positive sonographic Snyder's sign. Correlation advised. **This report has been created using voice recognition software. It may contain minor errors which are inherent in voice recognition technology. **      Final report electronically signed by Dr. Lisa Wlech on 1/22/2021 6:26 PM            ED Course as of Jan 22 2042   Fri Jan 22, 2021 2041 Discussed with surgeon on-call and the hospitalist request, no surgical intervention emergently at this moment, would like to see him in the office in the next week or so unless there is any change. Hospitalist notified of observation. [DT]      ED Course User Index  [DT] Heather Murillo MD         Further MDM and disposition:   Assessment: Biliary colic, possible early cholecystitis, conflicting results with significant leukocytosis and sonographic Snyder sign but with normal CBD, normal gallbladder wall and no pericholecystic fluid. Plan: We will place in observation and bowel rest overnight. Final diagnoses:   Generalized abdominal pain   Cholecystitis   Biliary colic     New Prescriptions    No medications on file         Condition: condition: fair  Dispo:  Admit to med/surg floor    This transcription was electronically signed. It was dictated by use of voice recognition software and electronically transcribed. The transcription may contain errors not detected in proofreading.         Alma Munguia MD  01/22/21 0958

## 2021-01-23 NOTE — ED NOTES
Patient assisted to restroom and returned to room at this time.  No new needs or concerns voiced      Clemente Martinez RN  01/22/21 2000

## 2021-01-23 NOTE — PROGRESS NOTES
Patient arrives to the pre-op holding area. Nasal swabbing protocol is performed and the patient's temperature is 98.5°F.

## 2021-01-23 NOTE — CONSULTS
[address] Surgery Consultation - Shira Schroeder MD      Pt Name: Libby Francis  MRN: [id number]  YOB: 1995  Date of evaluation: 1/23/2021  Primary Care Physician: Gaby Old  Patient evaluated at the request of  Dr. Lo Narvaez  Reason for evaluation: cholecystitis  IMPRESSIONS:   1. Acute calculus cholecystitis with exquisite right upper quadrant tenderness  2.  has no past medical history on file. RECOMMENDATIONS:   1. Broaden spectrum of IV antibiotics  2. Patient requires cholecystectomy. He wishes to proceed. Schedule patient for laparoscopic cholecystectomy possible open today. 3. Risks of and technical manner of procedure discussed with patient at length. Risks include but not limited to bleeding, infection, conversion to open procedure, bile duct leak, bile duct injury, organ injury as well as possibility of postoperative diarrhea and retained stones all discussed. Patient expressed understanding all questions answered. SUBJECTIVE:   History of Chief Complaint:    La Ramirez is a 30 y.o.male who presents with abdominal pain. He has had several episodes over the last 9 months of onset of exquisite episodes of abdominal discomfort in the upper abdomen right upper quadrant with radiation to the back. He states these episodes have been exacerbated by eating with no alleviating factors. He has gone to an outside emergency department several times given GI cocktail and discharged home. He reports no prior imaging studies. He presented to an outside emergency department and was discharged home with a GI cocktail pain persisted he called his primary care provider and was recommended to come to [facility]. He had an elevated white count at 21,000. Temperature was 100.6 upon admission. Ultrasound revealed multiple gallstones largest 1.5 cm. There was no biliary duct dilation reported wall thickening or pericholecystic fluid.   However on examination he is exquisitely tender in the right upper quadrant. He was started on Ancef by the hospitalist service who admitted him. Surgical consult called afternoon today. Sohail Armstrong reports no prior history of hepatitis, pancreatitis or jaundice. He denies any recent dark-colored urine. Previous surgeries include a laparoscopic appendectomy 10 years ago. COVID-19 screen is negative. Lipase normal on admission. Past Medical History   has no past medical history on file. Past Surgical History   has no past surgical history on file. Medications  Prior to Admission medications    Medication Sig Start Date End Date Taking? Authorizing Provider   escitalopram (LEXAPRO) 20 MG tablet Take 10 mg by mouth daily   Yes Historical Provider, MD   esomeprazole Magnesium (NEXIUM) 40 MG PACK Take 40 mg by mouth daily   Yes Historical Provider, MD   traZODone (DESYREL) 50 MG tablet Take 50 mg by mouth nightly   Yes Historical Provider, MD   buPROPion (WELLBUTRIN XL) 150 MG extended release tablet Take 150 mg by mouth every morning   Yes Historical Provider, MD    Scheduled Meds:   piperacillin-tazobactam (ZOSYN) 3375 mg in dextrose 5% IVPB extended infusion (mini-bag)  3,375 mg Intravenous Q8H    buPROPion  150 mg Oral QAM    sodium chloride flush  10 mL Intravenous 2 times per day    escitalopram  10 mg Oral Daily    traZODone  50 mg Oral Nightly    pantoprazole  40 mg Oral QAM AC     Continuous Infusions:   sodium chloride 125 mL/hr at 01/23/21 0101     PRN Meds:.morphine **OR** morphine, acetaminophen, sodium chloride flush, promethazine **OR** ondansetron  Allergies  is allergic to percocet [oxycodone-acetaminophen]. Family History  family history is not on file. Social History   reports that he has never smoked. He has never used smokeless tobacco. He reports that he does not use drugs.   Review of Systems  General Denies any fever or chills  HEENT Denies any diplopia, tinnitus or vertigo  Resp Denies any shortness of breath, cough or wheezing  Cardiac Denies any chest pain, palpitations, claudication or edema  GI Positive for nausea and right upper quadrant abdominal pain  Denies any melena, hematochezia, hematemesis or pyrosis   Denies any frequency, urgency, hesitancy or incontinence  Heme Denies bruising or bleeding easily  Endocrine Denies any history of diabetes or thyroid disease  Neuro Denies any focal motor or sensory deficits  SUBJECTIVE:   CURRENT VITALS:  height is 5' 10\" (1.778 m) and weight is 220 lb (99.8 kg). His oral temperature is 100.6 °F (38.1 °C). His blood pressure is 144/82 (abnormal) and his pulse is 100. His respiration is 18 and oxygen saturation is 98%. Body mass index is 31.57 kg/m². Temperature Range (24h):Temp: 100.6 °F (38.1 °C) Temp  Av.5 °F (37.5 °C)  Min: 98.3 °F (36.8 °C)  Max: 100.6 °F (38.1 °C)  BP Range (00C): Systolic (57ILL), XFV:649 , Min:137 , FKC:634     Diastolic (59ZYB), GKN:73, Min:82, Max:88    Pulse Range (24h): Pulse  Av  Min: 95  Max: 104  Respiration Range (24h): Resp  Av.2  Min: 18  Max: 19  Current Pulse Ox (24h):  SpO2: 98 %  Pulse Ox Range (24h):  SpO2  Av.2 %  Min: 94 %  Max: 99 %  Oxygen Amount and Delivery:    CONSTITUTIONAL: Alert oriented x3 looks uncomfortable but is not toxic  SKIN: Skin color, texture, turgor normal. No rashes or lesions. LYMPH: no cervical nodes, no inguinal nodes  HEENT: Head is normocephalic, atraumatic. EOMI, PERRLA. NECK: supple, symmetrical, trachea midline. CHEST/LUNGS: chest symmetric with normal A/P diameter, normal respiratory rate and rhythm, lungs clear to auscultation without wheezes, rales or rhonchi. No accessory muscle use. Scars None   CARDIOVASCULAR: Heart regular rate and rhythm   ABDOMEN: Soft lower abdomen tenderness palpation in the right upper quadrant with palpation in the left upper quadrant.   Exquisitely tender to palpation in the right upper quadrant with guarding   RECTAL: Deferred  NEUROLOGIC: There are no focalizing motor or sensory deficits. CN II-XII are grossly intact. Jarod Angry EXTREMITIES: no cyanosis, no clubbing and no edema. LABS:     Recent Labs     01/22/21  1733 01/22/21  1800 01/22/21  1935 01/22/21  2245 01/23/21  0417   WBC 21.2*  --   --   --  15.8*   HGB 15.9  --   --   --  14.3   HCT 47.1  --   --   --  42.9     --   --   --  183   NA  --  136  --   --  136   K  --  3.9  --   --  4.7   CL  --  101  --   --  102   CO2  --  26  --   --  27   BUN  --  10  --   --  9   CREATININE  --  0.7  --   --  1.0   CALCIUM  --  8.7  --   --  8.8   INR  --   --   --  1.17*  --    AST  --  27  --   --  33   ALT  --  35  --   --  43   BILITOT  --  0.7  --   --  0.6   BILIDIR  --  <0.2  --   --   --    LIPASE  --  17.0  --   --   --    LACTA  --   --   --  0.8  --    NITRU  --   --  NEGATIVE  --   --    COLORU  --   --  YELLOW  --   --    BACTERIA  --   --  NONE SEEN  --   --      RADIOLOGY:     CT INTERPRETATION OF OUTSIDE IMAGES   Final Result   1. Moderate scattered stool in the colon. No bowel wall thickening or obstruction. **This report has been created using voice recognition software. It may contain minor errors which are inherent in voice recognition technology. **      Final report electronically signed by Dr. Tex Welch on 1/22/2021 6:20 PM      US GALLBLADDER RUQ   Final Result    IMPRESSION:       Multiple gallstones, largest is 1.5 cm. Correlation with serology is advised. Technologist reports positive sonographic Snyder's sign. Correlation advised. **This report has been created using voice recognition software. It may contain minor errors which are inherent in voice recognition technology. **      Final report electronically signed by Dr. Tex Welch on 1/22/2021 6:26 PM          .    Electronically signed by Ladi Ignacio MD on 1/23/2021 at 2:40 PM

## 2021-01-23 NOTE — ANESTHESIA PRE PROCEDURE
Department of Anesthesiology  Preprocedure Note       Name:  Nithin Gallagher   Age:  30 y.o.  :  1995                                          MRN:  [id number]         Date:  2021      Surgeon: Malik Chamberlain):  Shelbi Kevin MD    Procedure: Procedure(s):  CHOLECYSTECTOMY LAPAROSCOPIC    Medications prior to admission:   Prior to Admission medications    Medication Sig Start Date End Date Taking?  Authorizing Provider   escitalopram (LEXAPRO) 20 MG tablet Take 10 mg by mouth daily   Yes Historical Provider, MD   esomeprazole Magnesium (NEXIUM) 40 MG PACK Take 40 mg by mouth daily   Yes Historical Provider, MD   traZODone (DESYREL) 50 MG tablet Take 50 mg by mouth nightly   Yes Historical Provider, MD   buPROPion (WELLBUTRIN XL) 150 MG extended release tablet Take 150 mg by mouth every morning   Yes Historical Provider, MD       Current medications:    Current Facility-Administered Medications   Medication Dose Route Frequency Provider Last Rate Last Admin    morphine (PF) injection 2 mg  2 mg Intravenous Q2H PRN Kamla Juárez MD   2 mg at 21 1422    Or    morphine injection 4 mg  4 mg Intravenous Q2H PRN Kamla Juárez MD   4 mg at 21 0101    piperacillin-tazobactam (ZOSYN) 3,375 mg in dextrose 5 % 50 mL IVPB extended infusion (mini-bag)  3,375 mg Intravenous Q8H Shelbi Kevin MD 12.5 mL/hr at 21 1803 3,375 mg at 21 1803    0.9 % sodium chloride infusion   Intravenous Continuous Kamla Juárez  mL/hr at 21 0101 Rate Change at 21 0101    acetaminophen (TYLENOL) tablet 650 mg  650 mg Oral Q4H PRN Kamla Juárez MD        buPROPion (WELLBUTRIN XL) extended release tablet 150 mg  150 mg Oral QAM Kamla Juárez MD   150 mg at 21 0855    sodium chloride flush 0.9 % injection 10 mL  10 mL Intravenous 2 times per day Radha Perez MD   10 mL at 21 0848    sodium chloride flush 0.9 % injection 10 mL  10 mL Intravenous 1/23/2021

## 2021-01-23 NOTE — PROGRESS NOTES
Hospitalist Progress Note      Patient:  Ruben Sharp    Unit/Bed:5K-14/014-A  YOB: 1995  MRN: [id number]   Acct: [de-identified]   PCP: Arslan Moss  Date of Admission: 1/22/2021    Assessment/Plan:    1. Acute cholecystitis: diffuse abd pain, worse in RUQ. Likely secondary to cholelithiasis, positive sonographic Snyder's on RUQ U/S. Tmax 100.6, WBC 15.8. PCT neg. LA wnl. NPO/IVFs. Gen Surg consulted, started pt on Zosyn. 2. GERD: PPI  3. Chronic depression:    Chief Complaint: abdominal pain    Initial H and P:-    \"26 y/o M PMHx GERD, depression -- presents to [facility] with a chief complaint of abdominal pain. History provided by the patient.     Patient presents with complaints of RUQ abdominal pain / has been present and episodic in nature since 3/2020 / episodes are usually brief, with no known precipitating/alleviating factors (including fatty foods) / states pain started again this evening and persisted, prompting ED evaluation. Pain described as severe (10/10) / sharp/cramping / localized to RUQ without radiation. + Nausea / no emesis. No recent fevers/chills, headache, chest pain, palpitations, cough, shortness of breath, diarrhea or constipation.      No history of abdominal surgeries.     No additional questions / concerns identified at this time.     ED course: febrile (Tmax 100.6 degrees), mildly hypertensive (150s/80s), mildly tachycardic (low 100s), RR nml with SpO2 94% on RA. Lab workup remarkable only for: WBC 21.1. RUQ U/S: multiple gallstones (largest 1.5 cm) / positive sonographic Snyder's / top normal GB wall at 3 mm / CBD 6 mm. Received IVF, morphine, zofran, protonix. ED provider discussed case with general surgery -- no indication for immediate surgery; consider IP consult vs short-term OP follow up pending ongoing clinical course. Hospitalist service contacted for admission.  Please see A&P for additional details. \"    Subjective (past 24 hours):   1/23-> pt doing okay, sitting in bed. Reports continued nausea, no emesis while admitted thus far. Denies fever/chills. No CP/SOB. Gen Surg to see today. Past medical history, family history, social history and allergies reviewed again and is unchanged since admission. ROS (12 point review of systems completed. Pertinent positives noted. Otherwise ROS is negative)     Medications:  Reviewed    Infusion Medications    sodium chloride 125 mL/hr at 01/23/21 0101     Scheduled Medications    piperacillin-tazobactam (ZOSYN) 3375 mg in dextrose 5% IVPB extended infusion (mini-bag)  3,375 mg Intravenous Q8H    buPROPion  150 mg Oral QAM    sodium chloride flush  10 mL Intravenous 2 times per day    escitalopram  10 mg Oral Daily    traZODone  50 mg Oral Nightly    pantoprazole  40 mg Oral QAM AC     PRN Meds: morphine **OR** morphine, acetaminophen, sodium chloride flush, promethazine **OR** ondansetron    No intake or output data in the 24 hours ending 01/23/21 1440    Diet:  Diet NPO Effective Now    Exam:  BP (!) 144/82   Pulse 100   Temp 100.6 °F (38.1 °C) (Oral)   Resp 18   Ht 5' 10\" (1.778 m)   Wt 220 lb (99.8 kg)   SpO2 98%   BMI 31.57 kg/m²   General appearance: No apparent distress, appears stated age and cooperative. HEENT: Pupils equal, round, and reactive to light. Conjunctivae/corneas clear. Neck: Supple, with full range of motion. No jugular venous distention. Trachea midline. Respiratory:  Normal respiratory effort. Clear to auscultation, bilaterally without Rales/Wheezes/Rhonchi. Cardiovascular: Regular rate and rhythm with normal S1/S2 without murmurs, rubs or gallops. Abdomen: Soft, diffusely tender worse in RUQ + Snyder's sign, non-distended with normal bowel sounds. Musculoskeletal: passive and active ROM x 4 extremities. Skin: Skin color, texture, turgor normal.  No rashes or lesions.   Neurologic:  Neurovascularly intact without any focal sensory/motor deficits. Cranial nerves: II-XII intact, grossly non-focal.  Psychiatric: Alert and oriented, thought content appropriate, normal insight  Capillary Refill: Brisk,< 3 seconds   Peripheral Pulses: +2 palpable, equal bilaterally     Labs:   Recent Labs     01/22/21  1733 01/23/21  0417   WBC 21.2* 15.8*   HGB 15.9 14.3   HCT 47.1 42.9    183     Recent Labs     01/22/21  1800 01/23/21  0417    136   K 3.9 4.7    102   CO2 26 27   BUN 10 9   CREATININE 0.7 1.0   CALCIUM 8.7 8.8     Recent Labs     01/22/21  1800 01/23/21  0417   AST 27 33   ALT 35 43   BILIDIR <0.2  --    BILITOT 0.7 0.6   ALKPHOS 74 72     Recent Labs     01/22/21  2245   INR 1.17*     No results for input(s): Jose A Fling in the last 72 hours. Microbiology:    Blood culture #1:   Lab Results   Component Value Date    BC No growth-preliminary  01/22/2021       Blood culture #2:No results found for: Ulysess Frances    Organism:No results found for: ORG    No results found for: LABGRAM    MRSA culture only:No results found for: [facility]    Urine culture: No results found for: LABURIN    Respiratory culture: No results found for: CULTRESP    Aerobic and Anaerobic :  No results found for: LABAERO  No results found for: LABANAE    Urinalysis:      Lab Results   Component Value Date    NITRU NEGATIVE 01/22/2021    WBCUA 0-2 01/22/2021    BACTERIA NONE SEEN 01/22/2021    RBCUA 3-5 01/22/2021    BLOODU NEGATIVE 01/22/2021    GLUCOSEU NEGATIVE 01/22/2021       Radiology:  CT INTERPRETATION OF OUTSIDE IMAGES   Final Result   1. Moderate scattered stool in the colon. No bowel wall thickening or obstruction. **This report has been created using voice recognition software. It may contain minor errors which are inherent in voice recognition technology. **      Final report electronically signed by Dr. Sam Trevino on 1/22/2021 6:20 PM      US GALLBLADDER RUQ   Final Result    IMPRESSION:       Multiple colon. No bowel wall thickening or obstruction. **This report has been created using voice recognition software. It may contain minor errors which are inherent in voice recognition technology. ** Final report electronically signed by Dr. Denise Wilhelm on 1/22/2021 6:20 PM      Electronically signed by Dalila Hernandez PA-C on 1/23/2021 at 2:40 PM

## 2021-01-24 VITALS
OXYGEN SATURATION: 95 % | HEIGHT: 70 IN | SYSTOLIC BLOOD PRESSURE: 108 MMHG | WEIGHT: 220 LBS | HEART RATE: 68 BPM | DIASTOLIC BLOOD PRESSURE: 68 MMHG | TEMPERATURE: 97.6 F | BODY MASS INDEX: 31.5 KG/M2 | RESPIRATION RATE: 20 BRPM

## 2021-01-24 LAB
ALBUMIN SERPL-MCNC: 3.6 G/DL (ref 3.5–5.1)
ALP BLD-CCNC: 73 U/L (ref 38–126)
ALT SERPL-CCNC: 57 U/L (ref 11–66)
ANION GAP SERPL CALCULATED.3IONS-SCNC: 11 MEQ/L (ref 8–16)
AST SERPL-CCNC: 38 U/L (ref 5–40)
BASOPHILS # BLD: 0.1 %
BASOPHILS ABSOLUTE: 0 THOU/MM3 (ref 0–0.1)
BILIRUB SERPL-MCNC: 0.5 MG/DL (ref 0.3–1.2)
BUN BLDV-MCNC: 15 MG/DL (ref 7–22)
CALCIUM SERPL-MCNC: 8.8 MG/DL (ref 8.5–10.5)
CHLORIDE BLD-SCNC: 102 MEQ/L (ref 98–111)
CO2: 23 MEQ/L (ref 23–33)
CREAT SERPL-MCNC: 0.7 MG/DL (ref 0.4–1.2)
EOSINOPHIL # BLD: 0 %
EOSINOPHILS ABSOLUTE: 0 THOU/MM3 (ref 0–0.4)
ERYTHROCYTE [DISTWIDTH] IN BLOOD BY AUTOMATED COUNT: 12.8 % (ref 11.5–14.5)
ERYTHROCYTE [DISTWIDTH] IN BLOOD BY AUTOMATED COUNT: 42 FL (ref 35–45)
GFR SERPL CREATININE-BSD FRML MDRD: > 90 ML/MIN/1.73M2
GLUCOSE BLD-MCNC: 139 MG/DL (ref 70–108)
HCT VFR BLD CALC: 42.2 % (ref 42–52)
HEMOGLOBIN: 13.8 GM/DL (ref 14–18)
IMMATURE GRANS (ABS): 0.09 THOU/MM3 (ref 0–0.07)
IMMATURE GRANULOCYTES: 0.7 %
LYMPHOCYTES # BLD: 6.9 %
LYMPHOCYTES ABSOLUTE: 0.9 THOU/MM3 (ref 1–4.8)
MCH RBC QN AUTO: 29.1 PG (ref 26–33)
MCHC RBC AUTO-ENTMCNC: 32.7 GM/DL (ref 32.2–35.5)
MCV RBC AUTO: 89 FL (ref 80–94)
MONOCYTES # BLD: 3.1 %
MONOCYTES ABSOLUTE: 0.4 THOU/MM3 (ref 0.4–1.3)
NUCLEATED RED BLOOD CELLS: 0 /100 WBC
PLATELET # BLD: 188 THOU/MM3 (ref 130–400)
PMV BLD AUTO: 10 FL (ref 9.4–12.4)
POTASSIUM SERPL-SCNC: 5 MEQ/L (ref 3.5–5.2)
RBC # BLD: 4.74 MILL/MM3 (ref 4.7–6.1)
SEG NEUTROPHILS: 89.2 %
SEGMENTED NEUTROPHILS ABSOLUTE COUNT: 12.1 THOU/MM3 (ref 1.8–7.7)
SODIUM BLD-SCNC: 136 MEQ/L (ref 135–145)
TOTAL PROTEIN: 6.9 G/DL (ref 6.1–8)
WBC # BLD: 13.6 THOU/MM3 (ref 4.8–10.8)

## 2021-01-24 PROCEDURE — 99024 POSTOP FOLLOW-UP VISIT: CPT | Performed by: SURGERY

## 2021-01-24 PROCEDURE — 6360000002 HC RX W HCPCS: Performed by: PHYSICIAN ASSISTANT

## 2021-01-24 PROCEDURE — 6370000000 HC RX 637 (ALT 250 FOR IP): Performed by: SURGERY

## 2021-01-24 PROCEDURE — 80053 COMPREHEN METABOLIC PANEL: CPT

## 2021-01-24 PROCEDURE — 85025 COMPLETE CBC W/AUTO DIFF WBC: CPT

## 2021-01-24 PROCEDURE — 36415 COLL VENOUS BLD VENIPUNCTURE: CPT

## 2021-01-24 PROCEDURE — 2580000003 HC RX 258: Performed by: SURGERY

## 2021-01-24 PROCEDURE — 99217 PR OBSERVATION CARE DISCHARGE MANAGEMENT: CPT | Performed by: PHYSICIAN ASSISTANT

## 2021-01-24 PROCEDURE — 6360000002 HC RX W HCPCS: Performed by: SURGERY

## 2021-01-24 PROCEDURE — G0378 HOSPITAL OBSERVATION PER HR: HCPCS

## 2021-01-24 RX ORDER — HYDROCODONE BITARTRATE AND ACETAMINOPHEN 5; 325 MG/1; MG/1
1 TABLET ORAL EVERY 4 HOURS PRN
Qty: 20 TABLET | Refills: 0 | Status: SHIPPED | OUTPATIENT
Start: 2021-01-24 | End: 2021-01-29

## 2021-01-24 RX ORDER — AMOXICILLIN AND CLAVULANATE POTASSIUM 875; 125 MG/1; MG/1
1 TABLET, FILM COATED ORAL 2 TIMES DAILY
Qty: 10 TABLET | Refills: 0 | Status: SHIPPED | OUTPATIENT
Start: 2021-01-24 | End: 2021-01-29

## 2021-01-24 RX ADMIN — BUPROPION HYDROCHLORIDE 150 MG: 150 TABLET, EXTENDED RELEASE ORAL at 08:07

## 2021-01-24 RX ADMIN — PIPERACILLIN AND TAZOBACTAM 3375 MG: 3; .375 INJECTION, POWDER, LYOPHILIZED, FOR SOLUTION INTRAVENOUS at 08:06

## 2021-01-24 RX ADMIN — HYDROMORPHONE HYDROCHLORIDE 0.5 MG: 1 INJECTION, SOLUTION INTRAMUSCULAR; INTRAVENOUS; SUBCUTANEOUS at 01:29

## 2021-01-24 RX ADMIN — HYDROCODONE BITARTRATE AND ACETAMINOPHEN 2 TABLET: 5; 325 TABLET ORAL at 08:13

## 2021-01-24 RX ADMIN — HYDROMORPHONE HYDROCHLORIDE 0.25 MG: 1 INJECTION, SOLUTION INTRAMUSCULAR; INTRAVENOUS; SUBCUTANEOUS at 09:28

## 2021-01-24 RX ADMIN — PANTOPRAZOLE SODIUM 40 MG: 40 TABLET, DELAYED RELEASE ORAL at 05:30

## 2021-01-24 RX ADMIN — SODIUM CHLORIDE, PRESERVATIVE FREE 10 ML: 5 INJECTION INTRAVENOUS at 08:18

## 2021-01-24 RX ADMIN — PIPERACILLIN AND TAZOBACTAM 3375 MG: 3; .375 INJECTION, POWDER, LYOPHILIZED, FOR SOLUTION INTRAVENOUS at 01:11

## 2021-01-24 RX ADMIN — ENOXAPARIN SODIUM 40 MG: 40 INJECTION SUBCUTANEOUS at 08:12

## 2021-01-24 RX ADMIN — HYDROCODONE BITARTRATE AND ACETAMINOPHEN 2 TABLET: 5; 325 TABLET ORAL at 12:25

## 2021-01-24 RX ADMIN — HYDROCODONE BITARTRATE AND ACETAMINOPHEN 2 TABLET: 5; 325 TABLET ORAL at 03:58

## 2021-01-24 RX ADMIN — HYDROMORPHONE HYDROCHLORIDE 0.25 MG: 1 INJECTION, SOLUTION INTRAMUSCULAR; INTRAVENOUS; SUBCUTANEOUS at 05:30

## 2021-01-24 RX ADMIN — ESCITALOPRAM 10 MG: 10 TABLET, FILM COATED ORAL at 08:07

## 2021-01-24 ASSESSMENT — PAIN DESCRIPTION - PAIN TYPE
TYPE: ACUTE PAIN
TYPE: ACUTE PAIN

## 2021-01-24 ASSESSMENT — PAIN DESCRIPTION - LOCATION: LOCATION: ABDOMEN

## 2021-01-24 ASSESSMENT — PAIN - FUNCTIONAL ASSESSMENT: PAIN_FUNCTIONAL_ASSESSMENT: ACTIVITIES ARE NOT PREVENTED

## 2021-01-24 ASSESSMENT — PAIN DESCRIPTION - ORIENTATION: ORIENTATION: RIGHT;LEFT

## 2021-01-24 ASSESSMENT — PAIN SCALES - GENERAL
PAINLEVEL_OUTOF10: 7
PAINLEVEL_OUTOF10: 0
PAINLEVEL_OUTOF10: 0

## 2021-01-24 NOTE — PROGRESS NOTES
Pt assisted to and from the restroom with assistance. Pt c/o increased pain with ambulation. Pain medication given per MAR. Will continue to montior pt.

## 2021-01-24 NOTE — DISCHARGE SUMMARY
Hospitalist Discharge Summary        Patient: Clifton Ortega  YOB: 1995  MRN: [id number]   Acct: [de-identified]    Primary Care Physician: Yolanda Silver date  1/22/2021    Discharge date:  1/24/2021 2:43 PM    Chief Complaint on presentation :-  abdominal pain       Discharge Assessment and Plan:-   1. Acute cholecystitis s/p lap cholecystectomy 1/23: diffuse abd pain, worse in RUQ. Likely secondary to cholelithiasis, positive sonographic Snyder's on RUQ U/S. Tmax 100.6, WBC 15.8. PCT neg. LA wnl. NPO/IVFs. Gen Surg consulted, started pt on Zosyn. 1/24-> Gen Surg okay with discharge, pt to complete 5 days Augmentin. Afebrile, pain controlled, sent with Norco. Incisional tenderness, tolerating oral intake. F/u with Gen Surg. 2. Acute normocytic anemia: Likely acute blood loss, hemoglobin stable. 3. GERD: PPI  4. Chronic depression:SSRI, wellbutrin, trazodone. 5.  Constipation: pt reported no BM for 3 days or so, encourage to take stool softeners at home. Informed of s/s to look for should he need to call gen surg/ return to ED.      Initial H and P and Hospital course:-  \"26 y/o M PMHx GERD, depression -- presents to [facility] with a chief complaint of abdominal pain. History provided by the patient.     Patient presents with complaints of RUQ abdominal pain / has been present and episodic in nature since 3/2020 / episodes are usually brief, with no known precipitating/alleviating factors (including fatty foods) / states pain started again this evening and persisted, prompting ED evaluation. Pain described as severe (10/10) / sharp/cramping / localized to RUQ without radiation. + Nausea / no emesis.  No recent fevers/chills, headache, chest pain, palpitations, cough, shortness of breath, diarrhea or constipation.      No history of abdominal surgeries.     No additional questions / concerns identified at this time.     ED course: febrile (Tmax 100.6 degrees), mildly hypertensive (150s/80s), mildly tachycardic (low 100s), RR nml with SpO2 94% on RA. Lab workup remarkable only for: WBC 21.1. RUQ U/S: multiple gallstones (largest 1.5 cm) / positive sonographic Snyder's / top normal GB wall at 3 mm / CBD 6 mm. Received IVF, morphine, zofran, protonix. ED provider discussed case with general surgery -- no indication for immediate surgery; consider IP consult vs short-term OP follow up pending ongoing clinical course. Hospitalist service contacted for admission. Please see A&P for additional details. \"    Patient was admitted 1/22 with right upper quadrant abdominal pain that has been present since 3/2020. Patient was found to have acute calculus cholecystitis and had positive sonographic Snyder sign and right upper quadrant ultrasound. Patient was also noted to have T-max 100.6, mild tachycardia, and leukocytosis. Patient was made n.p.o. and started on IV fluids. General surgery was consulted. Patient was started on Zosyn and decision to proceed with cholecystectomy was made with patient approval.  Patient underwent lap shobha 3/18 without complication. Patient was seen 1/24 by general surgery who was okay with patient being discharged home. Patient was afebrile, his pain was well controlled, he was sent with Norco per GEN surge for incisional tenderness. Patient was discharged with a 5-day course of Augmentin. He was a tolerating a diet at time of discharge, no nausea or vomiting. Patient was noted to have constipation. Patient was educated regarding use of stool softeners and signs and symptoms to look for should he need to return to ED or contact general surgery. VSS and suitable for discharge home.     Physical Exam:-  Vitals:   Patient Vitals for the past 24 hrs:   BP Temp Temp src Pulse Resp SpO2   01/24/21 0824 108/68 -- -- 68 20 95 %   01/24/21 0408 108/72 97.6 °F (36.4 °C) Axillary 70 20 95 %   01/24/21 0344 123/71 97.9 °F (36.6 °C) Oral 72 18 97 %   01/23/21 2015 (!) 141/82 98.9 °F (37.2 °C) Oral 99 18 99 %   01/23/21 2005 133/65 -- -- 87 21 100 %   01/23/21 2000 129/66 -- -- 90 20 100 %   01/23/21 1955 135/65 -- -- 94 22 99 %   01/23/21 1950 (!) 130/57 -- -- 88 20 99 %   01/23/21 1945 132/64 -- -- 92 23 99 %   01/23/21 1940 132/64 -- -- 96 19 99 %   01/23/21 1938 -- -- -- 99 19 99 %   01/23/21 1935 129/67 99.4 °F (37.4 °C) Temporal 109 -- (!) 85 %   01/23/21 1600 126/75 99.8 °F (37.7 °C) Oral 108 18 98 %     Weight:   Weight: 220 lb (99.8 kg)   24 hour intake/output:     Intake/Output Summary (Last 24 hours) at 1/24/2021 1443  Last data filed at 1/24/2021 0408  Gross per 24 hour   Intake 4522.78 ml   Output 20 ml   Net 4502.78 ml       1. General appearance: No apparent distress, appears stated age and cooperative. 2. HEENT: Normal cephalic, atraumatic without obvious deformity. Pupils equal, round, and reactive to light. Extra ocular muscles intact. Conjunctivae/corneas clear. 3. Neck: Supple, with full range of motion. No jugular venous distention. Trachea midline. 4. Respiratory:  Normal respiratory effort. Clear to auscultation, bilaterally without Rales/Wheezes/Rhonchi. 5. Cardiovascular: Regular rate and rhythm with normal S1/S2 without murmurs, rubs or gallops. 6. Abdomen: Soft, incisional tenderness, non-distended with normal bowel sounds. 7. Musculoskeletal:  No clubbing, cyanosis or edema bilaterally. 8. Skin: Skin color, texture, turgor normal.  No rashes or lesions. 9. Neurologic:  Neurovascularly intact without any focal sensory/motor deficits. Cranial nerves: II-XII intact, grossly non-focal.  10. Psychiatric: Alert and oriented, thought content appropriate, normal insight  11. Capillary Refill: Brisk,< 3 seconds   12.  Peripheral Pulses: +2 palpable, equal bilaterally       Discharge Medications:-      Medication List      START taking these medications    amoxicillin-clavulanate 875-125 MG per tablet  Commonly known as: AUGMENTIN  Take 1 tablet by mouth 2 times daily for 5 days     HYDROcodone-acetaminophen 5-325 MG per tablet  Commonly known as: NORCO  Take 1 tablet by mouth every 4 hours as needed for Pain for up to 5 days.         CONTINUE taking these medications    buPROPion 150 MG extended release tablet  Commonly known as: WELLBUTRIN XL     esomeprazole Magnesium 40 MG Pack  Commonly known as: NEXIUM     Lexapro 20 MG tablet  Generic drug: escitalopram     traZODone 50 MG tablet  Commonly known as: DESYREL           Where to Get Your Medications      These medications were sent to [facility]  [address] [place] [address]    Phone: [phone]   · amoxicillin-clavulanate 875-125 MG per tablet  · HYDROcodone-acetaminophen 5-325 MG per tablet          Labs :-  Recent Results (from the past 72 hour(s))   CBC auto differential    Collection Time: 01/22/21  5:33 PM   Result Value Ref Range    WBC 21.2 (H) 4.8 - 10.8 thou/mm3    RBC 5.51 4.70 - 6.10 mill/mm3    Hemoglobin 15.9 14.0 - 18.0 gm/dl    Hematocrit 47.1 42.0 - 52.0 %    MCV 85.5 80.0 - 94.0 fL    MCH 28.9 26.0 - 33.0 pg    MCHC 33.8 32.2 - 35.5 gm/dl    RDW-CV 12.9 11.5 - 14.5 %    RDW-SD 39.5 35.0 - 45.0 fL    Platelets 413 178 - 889 thou/mm3    MPV 10.1 9.4 - 12.4 fL    Seg Neutrophils 83.1 %    Lymphocytes 8.9 %    Monocytes 7.1 %    Eosinophils 0.1 %    Basophils 0.2 %    Immature Granulocytes 0.6 %    Segs Absolute 17.6 (H) 1.8 - 7.7 thou/mm3    Lymphocytes Absolute 1.9 1.0 - 4.8 thou/mm3    Monocytes Absolute 1.5 (H) 0.4 - 1.3 thou/mm3    Eosinophils Absolute 0.0 0.0 - 0.4 thou/mm3    Basophils Absolute 0.0 0.0 - 0.1 thou/mm3    Immature Grans (Abs) 0.12 (H) 0.00 - 0.07 thou/mm3    nRBC 0 /100 wbc   Basic Metabolic Panel    Collection Time: 01/22/21  6:00 PM   Result Value Ref Range    Sodium 136 135 - 145 meq/L    Potassium 3.9 3.5 - 5.2 meq/L    Chloride 101 98 - 111 meq/L    CO2 26 23 - 33 meq/L    Glucose 90 70 - 108 mg/dL    BUN 10 7 - 22 mg/dL    CREATININE 0.7 0.4 - 1.2 mg/dL    Calcium 8.7 8.5 - 10.5 mg/dL   Hepatic Function Panel    Collection Time: 01/22/21  6:00 PM   Result Value Ref Range    Alb 4.2 3.5 - 5.1 g/dL    Total Bilirubin 0.7 0.3 - 1.2 mg/dL    Bilirubin, Direct <0.2 0.0 - 0.3 mg/dL    Alkaline Phosphatase 74 38 - 126 U/L    AST 27 5 - 40 U/L    ALT 35 11 - 66 U/L    Total Protein 6.9 6.1 - 8.0 g/dL   Lipase    Collection Time: 01/22/21  6:00 PM   Result Value Ref Range    Lipase 17.0 5.6 - 51.3 U/L   Anion Gap    Collection Time: 01/22/21  6:00 PM   Result Value Ref Range    Anion Gap 9.0 8.0 - 16.0 meq/L   Glomerular Filtration Rate, Estimated    Collection Time: 01/22/21  6:00 PM   Result Value Ref Range    Est, Glom Filt Rate >90 ml/min/1.73m2   Osmolality    Collection Time: 01/22/21  6:00 PM   Result Value Ref Range    Osmolality Calc 270.5 (L) 275.0 - 300.0 mOsmol/kg   SPECIMEN REJECTION    Collection Time: 01/22/21  6:18 PM   Result Value Ref Range    Rejected Test Chemistries     Reason for Rejection see below    Urine with Reflexed Micro    Collection Time: 01/22/21  7:35 PM   Result Value Ref Range    Glucose, Ur NEGATIVE NEGATIVE mg/dl    Bilirubin Urine NEGATIVE NEGATIVE    Ketones, Urine NEGATIVE NEGATIVE    Specific Gravity, Urine 1.017 1.002 - 1.030    Blood, Urine NEGATIVE NEGATIVE    pH, UA 8.5 5.0 - 9.0    Protein, UA TRACE (A) NEGATIVE    Urobilinogen, Urine 1.0 0.0 - 1.0 eu/dl    Nitrite, Urine NEGATIVE NEGATIVE    Leukocyte Esterase, Urine NEGATIVE NEGATIVE    Color, UA YELLOW STRAW-YELLOW    Character, Urine CLEAR CLEAR-SL CLOUD    RBC, UA 3-5 0-2/hpf /hpf    WBC, UA 0-2 0-4/hpf /hpf    Epithelial Cells, UA 0-2 3-5/hpf /hpf    Bacteria, UA NONE SEEN FEW/NONE SEEN /hpf    Casts UA NONE SEEN NONE SEEN /lpf    Crystals, UA NONE SEEN NONE SEEN    Renal Epithelial, UA NONE SEEN NONE SEEN    Yeast, UA NONE SEEN NONE SEEN    CASTS 2 NONE SEEN NONE SEEN /lpf    MISCELLANEOUS 2 NONE SEEN Culture, Blood 1    Collection Time: 01/22/21 10:40 PM    Specimen: Blood   Result Value Ref Range    Blood Culture, Routine No growth-preliminary     Procalcitonin    Collection Time: 01/22/21 10:45 PM   Result Value Ref Range    Procalcitonin 0.14 (H) 0.01 - 0.09 ng/mL   Lactic acid, plasma    Collection Time: 01/22/21 10:45 PM   Result Value Ref Range    Lactic Acid 0.8 0.5 - 2.2 mmol/L   Protime-INR    Collection Time: 01/22/21 10:45 PM   Result Value Ref Range    INR 1.17 (H) 0.85 - 1.13   COVID-19    Collection Time: 01/23/21  3:15 AM   Result Value Ref Range    SARS-CoV-2, NAAT NOT DETECTED NOT DETECTED   CBC auto differential    Collection Time: 01/23/21  4:17 AM   Result Value Ref Range    WBC 15.8 (H) 4.8 - 10.8 thou/mm3    RBC 4.89 4.70 - 6.10 mill/mm3    Hemoglobin 14.3 14.0 - 18.0 gm/dl    Hematocrit 42.9 42.0 - 52.0 %    MCV 87.7 80.0 - 94.0 fL    MCH 29.2 26.0 - 33.0 pg    MCHC 33.3 32.2 - 35.5 gm/dl    RDW-CV 13.0 11.5 - 14.5 %    RDW-SD 41.7 35.0 - 45.0 fL    Platelets 225 221 - 086 thou/mm3    MPV 9.9 9.4 - 12.4 fL    Seg Neutrophils 77.0 %    Lymphocytes 12.8 %    Monocytes 8.6 %    Eosinophils 0.8 %    Basophils 0.3 %    Immature Granulocytes 0.5 %    Segs Absolute 12.2 (H) 1.8 - 7.7 thou/mm3    Lymphocytes Absolute 2.0 1.0 - 4.8 thou/mm3    Monocytes Absolute 1.4 (H) 0.4 - 1.3 thou/mm3    Eosinophils Absolute 0.1 0.0 - 0.4 thou/mm3    Basophils Absolute 0.0 0.0 - 0.1 thou/mm3    Immature Grans (Abs) 0.08 (H) 0.00 - 0.07 thou/mm3    nRBC 0 /100 wbc   Comprehensive Metabolic Panel w/ Reflex to MG    Collection Time: 01/23/21  4:17 AM   Result Value Ref Range    Glucose 108 70 - 108 mg/dL    CREATININE 1.0 0.4 - 1.2 mg/dL    BUN 9 7 - 22 mg/dL    Sodium 136 135 - 145 meq/L    Potassium reflex Magnesium 4.7 3.5 - 5.2 meq/L    Chloride 102 98 - 111 meq/L    CO2 27 23 - 33 meq/L    Calcium 8.8 8.5 - 10.5 mg/dL    AST 33 5 - 40 U/L    Alkaline Phosphatase 72 38 - 126 U/L    Total Protein 6.6 6.1 - 8.0 g/dL    Alb 3.8 3.5 - 5.1 g/dL    Total Bilirubin 0.6 0.3 - 1.2 mg/dL    ALT 43 11 - 66 U/L   Anion Gap    Collection Time: 01/23/21  4:17 AM   Result Value Ref Range    Anion Gap 7.0 (L) 8.0 - 16.0 meq/L   Glomerular Filtration Rate, Estimated    Collection Time: 01/23/21  4:17 AM   Result Value Ref Range    Est, Glom Filt Rate 89 (A) ml/min/1.73m2   CBC auto differential    Collection Time: 01/24/21  4:23 AM   Result Value Ref Range    WBC 13.6 (H) 4.8 - 10.8 thou/mm3    RBC 4.74 4.70 - 6.10 mill/mm3    Hemoglobin 13.8 (L) 14.0 - 18.0 gm/dl    Hematocrit 42.2 42.0 - 52.0 %    MCV 89.0 80.0 - 94.0 fL    MCH 29.1 26.0 - 33.0 pg    MCHC 32.7 32.2 - 35.5 gm/dl    RDW-CV 12.8 11.5 - 14.5 %    RDW-SD 42.0 35.0 - 45.0 fL    Platelets 817 670 - 682 thou/mm3    MPV 10.0 9.4 - 12.4 fL    Seg Neutrophils 89.2 %    Lymphocytes 6.9 %    Monocytes 3.1 %    Eosinophils 0.0 %    Basophils 0.1 %    Immature Granulocytes 0.7 %    Segs Absolute 12.1 (H) 1.8 - 7.7 thou/mm3    Lymphocytes Absolute 0.9 (L) 1.0 - 4.8 thou/mm3    Monocytes Absolute 0.4 0.4 - 1.3 thou/mm3    Eosinophils Absolute 0.0 0.0 - 0.4 thou/mm3    Basophils Absolute 0.0 0.0 - 0.1 thou/mm3    Immature Grans (Abs) 0.09 (H) 0.00 - 0.07 thou/mm3    nRBC 0 /100 wbc   Comprehensive metabolic panel    Collection Time: 01/24/21  4:23 AM   Result Value Ref Range    Glucose 139 (H) 70 - 108 mg/dL    CREATININE 0.7 0.4 - 1.2 mg/dL    BUN 15 7 - 22 mg/dL    Sodium 136 135 - 145 meq/L    Potassium 5.0 3.5 - 5.2 meq/L    Chloride 102 98 - 111 meq/L    CO2 23 23 - 33 meq/L    Calcium 8.8 8.5 - 10.5 mg/dL    AST 38 5 - 40 U/L    Alkaline Phosphatase 73 38 - 126 U/L    Total Protein 6.9 6.1 - 8.0 g/dL    Alb 3.6 3.5 - 5.1 g/dL    Total Bilirubin 0.5 0.3 - 1.2 mg/dL    ALT 57 11 - 66 U/L   Anion Gap    Collection Time: 01/24/21  4:23 AM   Result Value Ref Range    Anion Gap 11.0 8.0 - 16.0 meq/L   Glomerular Filtration Rate, Estimated    Collection Time: 01/24/21  4:23 AM Result Value Ref Range    Est, Glom Filt Rate >90 ml/min/1.73m2        Microbiology:    Blood culture #1:   Lab Results   Component Value Date    BC No growth-preliminary  01/22/2021       Blood culture #2:No results found for: Flora Tang    Organism:  No results found for: LABGRAM    MRSA culture only:No results found for: [facility]    Urine culture: No results found for: LABURIN  No results found for: ORG     Respiratory culture: No results found for: CULTRESP    Aerobic and Anaerobic :  No results found for: LABAERO  No results found for: LABANAE    Urinalysis:      Lab Results   Component Value Date    NITRU NEGATIVE 01/22/2021    WBCUA 0-2 01/22/2021    BACTERIA NONE SEEN 01/22/2021    RBCUA 3-5 01/22/2021    BLOODU NEGATIVE 01/22/2021    [address] NEGATIVE 01/22/2021       Radiology:-  Us Gallbladder Ruq    Result Date: 1/22/2021  PROCEDURE: US GALLBLADDER RUQ CLINICAL INFORMATION: epigastric and upper abdominal pain. COMPARISON: No prior study. TECHNIQUE:Real-time transabdominal ultrasound was performed. FINDINGS: Multiple gallstones. Largest is 1.4 x 0.6 x 1.5 cm. Common bile duct is 6 mm. Top normal gallbladder wall at 3 mm. Correlation with serology is advised. Technologist reports positive sonographic Snyder's sign. IMPRESSION: Multiple gallstones, largest is 1.5 cm. Correlation with serology is advised. Technologist reports positive sonographic Snyder's sign. Correlation advised. **This report has been created using voice recognition software. It may contain minor errors which are inherent in voice recognition technology. ** Final report electronically signed by Dr. Aura Khan on 1/22/2021 6:26 PM    Ct Interpretation Of Outside Images    Result Date: 1/22/2021  PROCEDURE: CT INTERPRETATION OF OUTSIDE IMAGES CLINICAL INFORMATION: interpretation of outside images . COMPARISON: No prior study. TECHNIQUE: Axial CT images from the lung bases to the upper thighs with coronal and sagittal reformats. FINDINGS: Minimal atelectasis in the lung bases. Spleen pancreas adrenal glands gallbladder liver kidneys are unremarkable no hydronephrosis. Moderate scattered stool in the colon. No bowel wall thickening or obstruction. No bladder wall thickening. Appendix is not identified. Normal caliber abdominal aorta. Small fat-containing umbilical hernia. No acute osseous findings. 1. Moderate scattered stool in the colon. No bowel wall thickening or obstruction. **This report has been created using voice recognition software. It may contain minor errors which are inherent in voice recognition technology. ** Final report electronically signed by Dr. Sam Trevino on 1/22/2021 6:20 PM       Follow-up scheduled after discharge :-    in the next few days with Asher Bronson  in the next few weeks with Gen Surg as appropriate    Consultations during this hospital stay:-  [] NONE [] Cardiology  [] Nephrology  [] Hemo onco   [] GI   [] ID  [] Endocrine  [] Pulm    [] Neuro    [] Psych   [] Urology  [] ENT   [x] G SURGERY   []Ortho    []CV surg    [] Palliative  [] Hospice [] Pain management   []    []TCU   [] PT/OT  OTHERS:-    Disposition: home  Condition at Discharge: Stable    Time Spent:- 45 minutes    Electronically signed by Rich Gray PA-C on 1/24/2021 at 2:43 PM  Discharging Hospitalist

## 2021-01-24 NOTE — OP NOTE
[address] [place] [address]                                OPERATIVE REPORT    PATIENT NAME: Cephus Sever                  :        1995  MED REC NO:   [id number]                           ROOM:       0014  ACCOUNT NO:   [de-identified]                           ADMIT DATE: 2021  PROVIDER:     Yanna Underwood M.D.    DATE OF PROCEDURE:  2021    PREOPERATIVE DIAGNOSIS:  Acute calculous cholecystitis. POSTOPERATIVE DIAGNOSIS:  Acute calculous cholecystitis with a patchy  gangrene in the mucosa of the gallbladder. PROCEDURE:  Laparoscopic cholecystectomy. ANESTHESIA:  General.    ESTIMATED BLOOD LOSS:  Less than 50 mL. SPECIMEN:  Gallbladder to Pathology    INDICATIONS:  See history and physical examination. DESCRIPTION OF PROCEDURE:  The patient was brought to the operating  room, placed supine on the operating table. He had pneumatic sequential  compression devices placed on the lower extremities. He had been given  Zosyn intravenously. After induction of general anesthesia, the  patient's abdomen was clipped, prepped and draped. Transverse incision  was made below the umbilicus. He had a small umbilical incisional  hernia from prior appendectomy. Fat was excised. Fascia was elevated. An incision was made. García cannula was inserted and CO2  pneumoperitoneum was introduced. Camera was inserted. 11 mm port was  placed in the epigastrium to the right as well as front ligament under  direct visualization. Two standard lateral 5-mm ports were placed also  under direct visualization. The patient was placed in reverse  Trendelenburg and placed with the left side down. Gallbladder could not  even be grasped. It was quite tense. Aspirated bilious fluid and the  wall was thickened. Fundus was then retracted upwards using an  atraumatic grasper.   Gentle downward lateral traction was applied to the infundibulum. Cystic duct was cleared from surrounding edematous fatty  tissue, clipped proximally and distally and divided. Artery was  identified behind, clipped proximally, distally and divided as well. Gallbladder was dissected off the liver bed using cautery technique. Gallbladder was then placed in an EndoCatch type bag and removed from  the abdominal cavity. Right upper quadrant was irrigated. There was a  bleeder in the liver bed, which was cauterized. Right upper quadrant  was copiously irrigated. There had been some spillage of bile from the  puncture point. This was irrigated and suctioned out as well. Yovana  powder was placed in the liver bed. There was no evidence of any  bleeding. All ports were removed and CO2 pneumoperitoneum was suctioned  from the abdominal cavity. All port sites were infiltrated with 0.5%  percent Marcaine with epinephrine. Infraumbilical fascia was closed  with interrupted O Ethibond suture. All skin incisions closed with  subcuticular 4-0 Vicryl suture. Skin glue was applied. The patient  tolerated the procedure well, was extubated in the operating suite and  transported to the recovery area. Findings were called to the patient's  wife and a message left.         Lakeisha Ponce M.D.    D: 01/23/2021 19:52:44       T: 01/23/2021 20:03:29     [email or web address]  Job#: [id number]     Doc#: [id number]    CC:  Adriano Villarreal M.D.

## 2021-01-24 NOTE — BRIEF OP NOTE
Brief Postoperative Note      Patient: Keanu Gonzalez  YOB: 1995  MRN: [id number]    Date of Procedure: 1/23/2021    Pre-Op Diagnosis: calculous cholecystits    Post-Op Diagnosis: Same  gangrenous     Procedure(s):  CHOLECYSTECTOMY LAPAROSCOPIC    Surgeon(s):  Daniel Cabrera MD    Assistant:  * No surgical staff found *    Anesthesia: General    Estimated Blood Loss (mL): Minimal    Complications: None    Specimens:   ID Type Source Tests Collected by Time Destination   A : Gallbladder Tissue Gallbladder SURGICAL PATHOLOGY Daniel Cabrera MD 1/23/2021 4421        Implants:  * No implants in log *      Drains: * No LDAs found *    Findings:     Electronically signed by Daniel Cabrera MD on 1/23/2021 at 7:32 PM

## 2021-01-24 NOTE — PROGRESS NOTES
Dav Delatorre MD  Postoperative Progress Note  Pt Name: Jimmy Canas Record Number: [id number]  Date of Birth 1995   Today's Date: 2021  ASSESSMENT   1. POD # 1 laparoscopic cholecystectomy acute cholecystitis  2.  has no past medical history on file. PLANS   1. Analgesics and antiemetics as needed  2. IV hydration  3.  diet as tolerated  4. Increase activity  5. Lovenox for DVT prophylaxis  Komal Thompson is doing well. He is afebrile. Pain is well controlled. He has only incisional tenderness. Discharge home later today 5-day course of oral antibiotics if tolerating oral intake and remains afebrile. CURRENT MEDICATIONS   Scheduled Meds:   piperacillin-tazobactam (ZOSYN) 3375 mg in dextrose 5% IVPB extended infusion (mini-bag)  3,375 mg Intravenous Q8H    buPROPion  150 mg Oral QAM    sodium chloride flush  10 mL Intravenous 2 times per day    escitalopram  10 mg Oral Daily    traZODone  50 mg Oral Nightly    pantoprazole  40 mg Oral QAM AC     Continuous Infusions:   sodium chloride Stopped (21 1905)     PRN Meds:. HYDROcodone 5 mg - acetaminophen **OR** HYDROcodone 5 mg - acetaminophen, HYDROmorphone **OR** HYDROmorphone, acetaminophen, sodium chloride flush, promethazine **OR** ondansetron  OBJECTIVE   CURRENT VITALS:  height is 5' 10\" (1.778 m) and weight is 220 lb (99.8 kg). His axillary temperature is 97.6 °F (36.4 °C). His blood pressure is 108/72 and his pulse is 70. His respiration is 20 and oxygen saturation is 95%. Body mass index is 31.57 kg/m².   Temperature Range (24h):Temp: 97.6 °F (36.4 °C) Temp  Av.5 °F (37.5 °C)  Min: 97 °F (36.1 °C)  Max: 99.9 °F (37.7 °C)  BP Range (73X): Systolic (79GFW), XZZ:304 , Min:90 , PZZ:178     Diastolic (54UOH), ODT:13, Min:45, Max:82    Pulse Range (24h): Pulse  Av  Min: 70  Max: 109  Respiration Range (24h): Resp  Av.1  Min: 0  Max: 23  Current Pulse Ox (24h):  SpO2: 95 %  Pulse Ox Range (24h):  SpO2  Av.3 %  Min: 84 %  Max: 100 %  Oxygen Amount and Delivery: O2 Flow Rate (L/min): 2 L/min  Incentive Spirometry Tx:            GENERAL: alert, cooperative, no distress  LUNGS: clear  HEART: normal rate  ABDOMEN: soft specks of incisional tenderness   INCISION: healing well, no significant drainage  EXTREMITY: no edema  In: 4522.8 [P.O.:400; I.V.:4122.8]  Out: 20      Date 01/24/21 0000 - 21   Shift 6395-6580 3606-8069 0292-1875 24 Hour Total   INTAKE   P.O.(mL/kg/hr) 200   200   Shift Total(mL/kg) 200(2)   200(2)   OUTPUT   Shift Total(mL/kg)       Weight (kg) 99.8 99.8 99.8 99.8     LABS     Recent Labs     21  1800 21  0423   WBC 21.2*  --  15.8* 13.6*   HGB 15.9  --  14.3 13.8*   HCT 47.1  --  42.9 42.2     --  183 188   NA  --  136 136 136   K  --  3.9 4.7 5.0   CL  --  101 102 102   CO2  --  26 27 23   BUN  --  10 9 15   CREATININE  --  0.7 1.0 0.7   CALCIUM  --  8.7 8.8 8.8      Recent Labs     21   INR 1.17*     Recent Labs     21  0423   AST 27  --  33 38   ALT 35  --  43 57   BILITOT 0.7  --  0.6 0.5   BILIDIR <0.2  --   --   --    LIPASE 17.0  --   --   --    LACTA  --  0.8  --   --      No results for input(s): TROPONINT in the last 72 hours. RADIOLOGY     CT INTERPRETATION OF OUTSIDE IMAGES   Final Result   1. Moderate scattered stool in the colon. No bowel wall thickening or obstruction. **This report has been created using voice recognition software. It may contain minor errors which are inherent in voice recognition technology. **      Final report electronically signed by Dr. Calvillo Boards on 2021 6:20 PM      US GALLBLADDER RUQ   Final Result    IMPRESSION:       Multiple gallstones, largest is 1.5 cm. Correlation with serology is advised. Technologist reports positive sonographic Snyder's sign. Correlation advised.

## 2021-01-24 NOTE — PROGRESS NOTES
Patient given discharge instruction and voiced understanding. Wife in room. Prescriptions sent to [facility] pharmacy. All belongings sent with patient . Patient complained of pain and gave norco. Patient and wife left with transport. Patient chart broke down and signed sheet put into yellow box.

## 2021-01-24 NOTE — PROGRESS NOTES
1935 Pt transferred to PACU, see flow sheet for assessment. 1938 Pt will briefly awaken when state his name and light touch. No longer needing jaw thrust.   1946 Pt remains asleep, softly snoring. Report called to [address] Pt able to awaken on his own, then doze back to sleep. JODY Browning rounding and states she tried to contact pt's wife with no answer. 2000 Pt off/on asleep, aware surgery is over. 2006 Pt transferred to [facility].  2012 During transport to , pt started to complain of abdominal pain.  nurse ok if prn med given -see MAR.

## 2021-01-24 NOTE — ANESTHESIA POSTPROCEDURE EVALUATION
Department of Anesthesiology  Postprocedure Note    Patient: Germain Malone  MRN: [id number]  YOB: 1995  Date of evaluation: 1/23/2021  Time:  8:05 PM     Procedure Summary     Date: 01/23/21 Room / Location: [facility]    Anesthesia Start: 1823 Anesthesia Stop: 1940    Procedure: CHOLECYSTECTOMY LAPAROSCOPIC (N/A Abdomen) Diagnosis: (laparoscopic cholecystectomy)    Surgeons: Gabi Lopez MD Responsible Provider: Mary Rutledge DO    Anesthesia Type: general ASA Status: 2          Anesthesia Type: No value filed. Evaristo Phase I: Evaristo Score: 4    Evaristo Phase II:      Last vitals: Reviewed and per EMR flowsheets.        Anesthesia Post Evaluation    Patient location during evaluation: PACU  Patient participation: complete - patient participated  Level of consciousness: sleepy but conscious, responsive to verbal stimuli and responsive to light touch  Pain score: 3  Airway patency: patent  Nausea & Vomiting: no nausea and no vomiting  Complications: no  Cardiovascular status: hemodynamically stable and blood pressure returned to baseline  Respiratory status: spontaneous ventilation, room air and acceptable  Hydration status: stable

## 2021-01-25 ENCOUNTER — TELEPHONE (OUTPATIENT)
Dept: SURGERY | Age: 26
End: 2021-01-25

## 2021-01-25 NOTE — TELEPHONE ENCOUNTER
LM asking pt to call office to schedule appointment to see Dr. Angel Whitley (s/p fern yeager 1/23/21)

## 2021-01-28 LAB — BLOOD CULTURE, ROUTINE: NORMAL

## 2021-02-08 ENCOUNTER — OFFICE VISIT (OUTPATIENT)
Dept: SURGERY | Age: 26
End: 2021-02-08

## 2021-02-08 VITALS
HEIGHT: 70 IN | BODY MASS INDEX: 31.74 KG/M2 | RESPIRATION RATE: 15 BRPM | TEMPERATURE: 97.2 F | HEART RATE: 72 BPM | WEIGHT: 221.7 LBS | SYSTOLIC BLOOD PRESSURE: 115 MMHG | DIASTOLIC BLOOD PRESSURE: 67 MMHG | OXYGEN SATURATION: 98 %

## 2021-02-08 DIAGNOSIS — Z09 POSTOP CHECK: ICD-10-CM

## 2021-02-08 DIAGNOSIS — K80.00 CALCULUS OF GALLBLADDER WITH ACUTE CHOLECYSTITIS WITHOUT OBSTRUCTION: Primary | ICD-10-CM

## 2021-02-08 PROCEDURE — 99024 POSTOP FOLLOW-UP VISIT: CPT | Performed by: SURGERY

## 2021-02-08 NOTE — LETTER
[facility] Surgery  [address]  Phone: [phone]  Fax: [phone]    Elisa Caballero MD        February 8, 2021    Mercedes Lyn  [address] [address]    Patient: Monique Wei   MR Number: [id number]   YOB: 1995   Date of Visit: 2/8/2021     Dear Bedford Fothergill,    I recently saw your patient, Monique Wei for a follow-up visit. Below are the relevant portions of my assessment and plan of care. 1. Calculus of gallbladder with acute cholecystitis without obstruction    2. Postop check        1. Pathology discussed with patient. Necrotizing calculus cholecystitis. 2.  Patient has no complaints of chronic diarrhea. Only coffee seems to bother him. Denies wound issues. Follow-up surgical clinic as needed. 3.  Recommend no lifting over 40 to 50 pounds for a month. If you have questions, please do not hesitate to call me.      Sincerely,          Elisa Caballero MD

## 2021-02-08 NOTE — PROGRESS NOTES
Vipul Thomas MD   General Surgery  Postprocedure Evaluation in Office  Pt Name: Terrence Verdugo  Date of Birth 1995   Today's Date: 2/8/2021  Medical Record Number: [id number]  Primary Care Provider: Jed Liao  Chief Complaint   Patient presents with    Post-Op Check     s/p Laparoscopic cholecystectomy 1/23/21     ASSESSMENT      1. Calculus of gallbladder with acute cholecystitis without obstruction    2. Postop check         PLAN       1. Pathology discussed with patient. Necrotizing calculus cholecystitis. 2.  Patient has no complaints of chronic diarrhea. Only coffee seems to bother him. Denies wound issues. Follow-up surgical clinic as needed. 3.  Recommend no lifting over 40 to 50 pounds for a month. Kamille Arrington is seen today for post-op follow-up. He is 2 weeks status post laparoscopic cholecystectomy for necrotizing calculus cholecystitis. He is doing well. He states his appetite is not the best but he wants to lose some weight anyways. He denies any fever or chills. He denies any chronic diarrhea. He states only coffee tends to run right through him. His incisions are intact no evidence of hernia. No wound drainage. Medications    Current Outpatient Medications:     escitalopram (LEXAPRO) 20 MG tablet, Take 10 mg by mouth daily, Disp: , Rfl:     esomeprazole Magnesium (NEXIUM) 40 MG PACK, Take 40 mg by mouth daily, Disp: , Rfl:     buPROPion (WELLBUTRIN XL) 150 MG extended release tablet, Take 150 mg by mouth every morning, Disp: , Rfl:     traZODone (DESYREL) 50 MG tablet, Take 50 mg by mouth nightly, Disp: , Rfl:     Allergies  Allergies   Allergen Reactions    Percocet [Oxycodone-Acetaminophen] Hives and Rash       Review of Systems  History obtained from the patient. Constitutional: Denies any fever, chills, fatigue. Wound: Denies any rash, skin color changes or wound problems. Resp: Denies any cough, shortness of breath.   CV: Denies any chest pain, orthopnea or syncope. GI: Positive for incisional discomfort only. Denies any nausea, vomiting, blood in the stool, constipation or diarrhea. : Denies any hematuria, hesitancy or dysuria. OBJECTIVE     VITALS: /67 (Site: Right Upper Arm, Position: Sitting, Cuff Size: Medium Adult)   Pulse 72   Temp 97.2 °F (36.2 °C) (Tympanic)   Resp 15   Ht 5' 10\" (1.778 m)   Wt 221 lb 11.2 oz (100.6 kg)   SpO2 98%   BMI 31.81 kg/m²     CONSTITUTIONAL: Alert and oriented times 3, no acute distress and cooperative to examination. SKIN: Skin color, texture, turgor normal. No rashes or lesions. INCISION: wound margins intact and healing well. No signs of infection. No drainage. LUNGS: Lungs Clear  CARDIOVASCULAR: Normal Rate  ABDOMEN: Soft nontender no hernia   NEUROLOGIC: No sensory or motor nerve irritation      Performed by: Rose Slade. Pathology   [place], [place]                [id number]   Assoc.                                              Page 1 of 1   [address], [place] [address]                                                       PROC: 2021   [facility]                                    RECV: 2021   [address] [facility]                                    RPTD: 2021   [address], [place] [address]                       MRN:  [id number]    LOC: 5KS                       ACCT: [id number]  SEX: M                       : 1995  AGE: 30 Y                          PATHOLOGY REPORT                       ATTN: ARIELLA LEA                       REQ: ZACK JACOBSON       Copies To:   BLACK LEA       Clinical Information: LAPAROSCOPIC CHOLECYSTECTOMY     FINAL DIAGNOSIS:   Gallbladder, cholecystectomy:    Acute necrotizing cholecystitis and chronic cholecystitis.    Cholelithiasis.      Specimen:   GALLBLADDER       Gross Examination:   The container is labeled Jolly Durant, gallbladder.  Received in   formalin is a gallbladder measuring 7 cm in length x 2.8 cm in   diameter.  The surface is hemorrhagic. [place] is a surgically-induced   defect in the wall.  No lymph nodes are grossly identified.  The lumen   contains several yellow nodular stones, the largest about 1.5 cm.  The   wall measures 0.5 cm in thickness and the mucosal surface is green-tan   and granular.  The cystic duct is patent.  Representative sections   including the cystic duct margin are submitted.  1 ss. MTK/DKR:[id number]     Microscopic Examination:   Microscopic examination was performed. [id number]                                                     <Sign Out Dr. Codie Kunz M.D., F.C.A.P.        NVML/ [id number] [facility]  Printed on:  1/26/2021   Janice Gardner [address]   [facility] [place] [address], [facility]   Original print date: 01/26/2021

## 2022-02-10 ENCOUNTER — APPOINTMENT (OUTPATIENT)
Dept: GENERAL RADIOLOGY | Age: 27
End: 2022-02-10
Payer: COMMERCIAL

## 2022-02-10 ENCOUNTER — HOSPITAL ENCOUNTER (EMERGENCY)
Age: 27
Discharge: HOME | End: 2022-02-10
Payer: COMMERCIAL

## 2022-02-10 VITALS
HEIGHT: 70 IN | WEIGHT: 220 LBS | OXYGEN SATURATION: 100 % | HEART RATE: 81 BPM | RESPIRATION RATE: 18 BRPM | SYSTOLIC BLOOD PRESSURE: 149 MMHG | DIASTOLIC BLOOD PRESSURE: 97 MMHG | BODY MASS INDEX: 31.5 KG/M2 | TEMPERATURE: 98.2 F

## 2022-02-10 DIAGNOSIS — F32.A DEPRESSION WITH SUICIDAL IDEATION: Primary | ICD-10-CM

## 2022-02-10 DIAGNOSIS — R45.851 DEPRESSION WITH SUICIDAL IDEATION: Primary | ICD-10-CM

## 2022-02-10 LAB
ACETAMINOPHEN LEVEL: < 5 UG/ML (ref 0–20)
ALBUMIN SERPL-MCNC: 4.6 G/DL (ref 3.5–5.1)
ALP BLD-CCNC: 93 U/L (ref 38–126)
ALT SERPL-CCNC: 47 U/L (ref 11–66)
AMPHETAMINE+METHAMPHETAMINE URINE SCREEN: NEGATIVE
ANION GAP SERPL CALCULATED.3IONS-SCNC: 11 MEQ/L (ref 8–16)
AST SERPL-CCNC: 26 U/L (ref 5–40)
BARBITURATE QUANTITATIVE URINE: NEGATIVE
BASOPHILS # BLD: 0.4 %
BASOPHILS ABSOLUTE: 0 THOU/MM3 (ref 0–0.1)
BENZODIAZEPINE QUANTITATIVE URINE: NEGATIVE
BILIRUB SERPL-MCNC: 0.3 MG/DL (ref 0.3–1.2)
BILIRUBIN URINE: NEGATIVE
BLOOD, URINE: NEGATIVE
BUN BLDV-MCNC: 13 MG/DL (ref 7–22)
CALCIUM SERPL-MCNC: 9.5 MG/DL (ref 8.5–10.5)
CANNABINOID QUANTITATIVE URINE: NEGATIVE
CHARACTER, URINE: CLEAR
CHLORIDE BLD-SCNC: 103 MEQ/L (ref 98–111)
CO2: 24 MEQ/L (ref 23–33)
COCAINE METABOLITE QUANTITATIVE URINE: NEGATIVE
COLOR: YELLOW
CREAT SERPL-MCNC: 0.9 MG/DL (ref 0.4–1.2)
EKG ATRIAL RATE: 69 BPM
EKG P AXIS: 67 DEGREES
EKG P-R INTERVAL: 156 MS
EKG Q-T INTERVAL: 380 MS
EKG QRS DURATION: 88 MS
EKG QTC CALCULATION (BAZETT): 407 MS
EKG R AXIS: 57 DEGREES
EKG T AXIS: 49 DEGREES
EKG VENTRICULAR RATE: 69 BPM
EOSINOPHIL # BLD: 1.4 %
EOSINOPHILS ABSOLUTE: 0.2 THOU/MM3 (ref 0–0.4)
ERYTHROCYTE [DISTWIDTH] IN BLOOD BY AUTOMATED COUNT: 12.6 % (ref 11.5–14.5)
ERYTHROCYTE [DISTWIDTH] IN BLOOD BY AUTOMATED COUNT: 39.7 FL (ref 35–45)
ETHYL ALCOHOL, SERUM: < 0.01 %
GFR SERPL CREATININE-BSD FRML MDRD: > 90 ML/MIN/1.73M2
GLUCOSE BLD-MCNC: 77 MG/DL (ref 70–108)
GLUCOSE URINE: NEGATIVE MG/DL
HCT VFR BLD CALC: 48.6 % (ref 42–52)
HEMOGLOBIN: 16.2 GM/DL (ref 14–18)
IMMATURE GRANS (ABS): 0.06 THOU/MM3 (ref 0–0.07)
IMMATURE GRANULOCYTES: 0.5 %
KETONES, URINE: NEGATIVE
LEUKOCYTE ESTERASE, URINE: NEGATIVE
LYMPHOCYTES # BLD: 29.9 %
LYMPHOCYTES ABSOLUTE: 3.3 THOU/MM3 (ref 1–4.8)
MCH RBC QN AUTO: 29.1 PG (ref 26–33)
MCHC RBC AUTO-ENTMCNC: 33.3 GM/DL (ref 32.2–35.5)
MCV RBC AUTO: 87.3 FL (ref 80–94)
MONOCYTES # BLD: 7.8 %
MONOCYTES ABSOLUTE: 0.9 THOU/MM3 (ref 0.4–1.3)
NITRITE, URINE: NEGATIVE
NUCLEATED RED BLOOD CELLS: 0 /100 WBC
OPIATES, URINE: NEGATIVE
OSMOLALITY CALCULATION: 274.6 MOSMOL/KG (ref 275–300)
OXYCODONE: NEGATIVE
PH UA: >= 9 (ref 5–9)
PHENCYCLIDINE QUANTITATIVE URINE: NEGATIVE
PLATELET # BLD: 214 THOU/MM3 (ref 130–400)
PMV BLD AUTO: 9.8 FL (ref 9.4–12.4)
POTASSIUM REFLEX MAGNESIUM: 4.1 MEQ/L (ref 3.5–5.2)
PROTEIN UA: NEGATIVE
RBC # BLD: 5.57 MILL/MM3 (ref 4.7–6.1)
SALICYLATE, SERUM: < 0.3 MG/DL (ref 2–10)
SEG NEUTROPHILS: 60 %
SEGMENTED NEUTROPHILS ABSOLUTE COUNT: 6.7 THOU/MM3 (ref 1.8–7.7)
SODIUM BLD-SCNC: 138 MEQ/L (ref 135–145)
SPECIFIC GRAVITY, URINE: 1.01 (ref 1–1.03)
TOTAL PROTEIN: 7.6 G/DL (ref 6.1–8)
TROPONIN T: < 0.01 NG/ML
TSH SERPL DL<=0.05 MIU/L-ACNC: 3.28 UIU/ML (ref 0.4–4.2)
UROBILINOGEN, URINE: 1 EU/DL (ref 0–1)
WBC # BLD: 11.1 THOU/MM3 (ref 4.8–10.8)

## 2022-02-10 PROCEDURE — 99283 EMERGENCY DEPT VISIT LOW MDM: CPT

## 2022-02-10 PROCEDURE — 80307 DRUG TEST PRSMV CHEM ANLYZR: CPT

## 2022-02-10 PROCEDURE — 80179 DRUG ASSAY SALICYLATE: CPT

## 2022-02-10 PROCEDURE — 82077 ASSAY SPEC XCP UR&BREATH IA: CPT

## 2022-02-10 PROCEDURE — 93010 ELECTROCARDIOGRAM REPORT: CPT | Performed by: NUCLEAR MEDICINE

## 2022-02-10 PROCEDURE — 80143 DRUG ASSAY ACETAMINOPHEN: CPT

## 2022-02-10 PROCEDURE — 84484 ASSAY OF TROPONIN QUANT: CPT

## 2022-02-10 PROCEDURE — 84443 ASSAY THYROID STIM HORMONE: CPT

## 2022-02-10 PROCEDURE — 80053 COMPREHEN METABOLIC PANEL: CPT

## 2022-02-10 PROCEDURE — 36415 COLL VENOUS BLD VENIPUNCTURE: CPT

## 2022-02-10 PROCEDURE — 85025 COMPLETE CBC W/AUTO DIFF WBC: CPT

## 2022-02-10 PROCEDURE — 93005 ELECTROCARDIOGRAM TRACING: CPT | Performed by: PHYSICIAN ASSISTANT

## 2022-02-10 PROCEDURE — 81003 URINALYSIS AUTO W/O SCOPE: CPT

## 2022-02-10 PROCEDURE — 71045 X-RAY EXAM CHEST 1 VIEW: CPT

## 2022-02-10 ASSESSMENT — ENCOUNTER SYMPTOMS
SHORTNESS OF BREATH: 1
CHEST TIGHTNESS: 1

## 2022-02-10 ASSESSMENT — SLEEP AND FATIGUE QUESTIONNAIRES
RESTFUL SLEEP: YES
DO YOU HAVE DIFFICULTY SLEEPING: YES
DIFFICULTY STAYING ASLEEP: YES
DIFFICULTY FALLING ASLEEP: YES
DIFFICULTY ARISING: YES
DO YOU USE A SLEEP AID: YES
SLEEP PATTERN: DIFFICULTY FALLING ASLEEP;DISTURBED/INTERRUPTED SLEEP
AVERAGE NUMBER OF SLEEP HOURS: 5

## 2022-02-10 ASSESSMENT — PATIENT HEALTH QUESTIONNAIRE - PHQ9: SUM OF ALL RESPONSES TO PHQ QUESTIONS 1-9: 21

## 2022-02-10 NOTE — ED TRIAGE NOTES
Pt presents to the ED through triage with c/c anxiety and suicidal thoughts. Pt not saying anything during triage, but wife at bedside states that pt has been saying he's been thinking about how he wants to kill himself. Vitals stable. Wife at bedside. Level A paged. Patient placed in safe room that is ligature resistant with continuous monitoring in place. Provider notified, requested an assessment by behavioral health . Patient belongings secured in a locked lockers outside of the room. Explained suicide prevention precautions to the patient including constant observer.

## 2022-02-11 NOTE — ED PROVIDER NOTES
[address]     Pt Name: Madhu Muniz  MRN: [id number]  [place] 1995  Date of evaluation: 2/11/22        Mid-level provider Note:    I have personally performed and/or participated in the history, exam and medical decision making and agree with all pertinent clinical information as noted by the previous provider. I have also reviewed and agree with the past medical, family and social history unless otherwise noted. I have personally performed a face to face diagnostic evaluation on this patient. I have reviewed the previous provider's findings and agree. Evaluation: I assumed care of this patient from [place], [place] pending [facility] evaluation after medical clearance. Medical clearance was obtained. The patient was evaluated by [facility]. Psychiatry recommends discharge the patient. They indicate that the patient can follow-up with his established psychiatrist tomorrow. Patient and spouse have a plan already to do such. Discharged home           XR CHEST PORTABLE    Result Date: 2/10/2022  PROCEDURE: XR CHEST PORTABLE CLINICAL INFORMATION: chest pain . TECHNIQUE: Portable upright COMPARISON: No prior study. FINDINGS: Heart and mediastinal contours are within normal limits. There are no infiltrates or effusions. No acute cardiopulmonary disease. **This report has been created using voice recognition software. It may contain minor errors which are inherent in voice recognition technology. ** Final report electronically signed by Dr. Ofelia Lindsey on 2/10/2022 7:28 PM        DIAGNOSIS  1. Depression with suicidal ideation           DISPOSITION/PLAN  PATIENT REFERRED TO:  [address] [place] [address],[address] [place] [address]        Laurel Arteaga MD    Go in 1 day      [facility]  [phone]  Call   As needed if thoughts of suicide develop or return.  Or return to [facility] hospital or go to [employer].      DISCHARGE MEDICATIONS:  Discharge Medication List as of 2/10/2022 10:46 PM            DANIEL Quiñones CNP, APRN - CNP  02/11/22 1943

## 2022-02-11 NOTE — ED PROVIDER NOTES
[facility]  eMERGENCY dEPARTMENT eNCOUnter          CHIEF COMPLAINT       Chief Complaint   Patient presents with    Anxiety    Suicidal       Nurses Notes reviewed and I agree except as noted inthe HPI. HISTORY OF PRESENT ILLNESS    Madhu Muniz is a 27 y.o. male who presents to the Emergency Department for the evaluation of suicidal ideation. Patient states that for years he has had waxing and waning feelings as though he is not good enough. States he struggles with worklife balance and feels like he is not enough for his children. States he is always tired. Over the past 3 days, he states he has been having some suicidal thoughts. States he wishes he would and things but states he does not \"have the guts. \" Denies any prior history of suicide attempts and denies any plan. States he does not have any guns at home and has thought about who would find him. He denies any homicidal ideation, hallucinations, drug use reports only appropriate social alcohol use. He reports compliance with his medications without any recent medication changes but wife does note that he stopped taking his Seroquel around [place]. Patient states he did so because it made him feel numb, like a zombie and he never reached out to his prescriber to discuss the side effects or any medication changes. Patient states that he had some chest tightness and shortness of breath earlier today which have resolved. He states it was worse when his stress level would increase. No history of similar pain in the past.  No history of cardiac disease and no family history of cardiac problems. Denies any recent illness or injury. Denies any current chest tightness or shortness of breath. The HPI was provided by the patient. REVIEW OF SYSTEMS     Review of Systems   Respiratory: Positive for chest tightness and shortness of breath. Psychiatric/Behavioral: Positive for suicidal ideas.    All other systems reviewed and are negative. PAST MEDICAL HISTORY    has a past medical history of Anxiety. SURGICAL HISTORY      has a past surgical history that includes Cholecystectomy, laparoscopic (N/A, 01/23/2021) and Appendectomy (2009). CURRENT MEDICATIONS       Previous Medications    BUPROPION (WELLBUTRIN XL) 150 MG EXTENDED RELEASE TABLET    Take 150 mg by mouth every morning    ESCITALOPRAM (LEXAPRO) 20 MG TABLET    Take 10 mg by mouth daily    ESOMEPRAZOLE MAGNESIUM (NEXIUM) 40 MG PACK    Take 40 mg by mouth daily    TRAZODONE (DESYREL) 50 MG TABLET    Take 50 mg by mouth nightly       ALLERGIES     is allergic to percocet [oxycodone-acetaminophen]. FAMILY HISTORY     has no family status information on file. family history is not on file. SOCIAL HISTORY      reports that he has never smoked. He has never used smokeless tobacco. He reports that he does not use drugs. PHYSICAL EXAM     INITIAL VITALS:  height is 5' 10\" (1.778 m) and weight is 220 lb (99.8 kg). His oral temperature is 98.2 °F (36.8 °C). His blood pressure is 149/97 (abnormal) and his pulse is 81. His respiration is 18 and oxygen saturation is 100%. Physical Exam  Vitals and nursing note reviewed. HENT:      Head: Normocephalic and atraumatic. Eyes:      Conjunctiva/sclera: Conjunctivae normal.   Cardiovascular:      Rate and Rhythm: Normal rate and regular rhythm. Heart sounds: Normal heart sounds. No murmur heard. Pulmonary:      Effort: Pulmonary effort is normal. No respiratory distress. Breath sounds: Decreased breath sounds present. No wheezing or rhonchi. Abdominal:      Palpations: Abdomen is soft. Tenderness: There is no abdominal tenderness. Musculoskeletal:      Cervical back: Normal range of motion. Skin:     General: Skin is warm and dry. Neurological:      General: No focal deficit present. Mental Status: He is alert and oriented to person, place, and time.    Psychiatric: Mood and Affect: Mood is depressed. Affect is not tearful. Behavior: Behavior is not slowed or withdrawn. Thought Content: Thought content includes suicidal ideation. Thought content does not include homicidal ideation. Thought content does not include suicidal plan. DIFFERENTIAL DIAGNOSIS:   Differential diagnoses are discussed    DIAGNOSTIC RESULTS     EKG: All EKG's are interpreted by the Emergency Department Physician who either signs or Co-signsthis chart in the absence of a cardiologist.    Chance Mclaughlin. Rate: 69 bpm  PRinterval: 156 ms  QRS duration: 88 ms  QTc: 407 ms  P-R-T axes: 67, 57, 49  NSR. No STEMI        RADIOLOGY: non-plain film images(s) such as CT, Ultrasound and MRI are read by the radiologist.    XR CHEST PORTABLE   Final Result   No acute cardiopulmonary disease. **This report has been created using voice recognition software. It may contain minor errors which are inherent in voice recognition technology. **      Final report electronically signed by Dr. Renetta Mccartney on 2/10/2022 7:28 PM          LABS:      Labs Reviewed   URINE RT REFLEX TO CULTURE   URINE DRUG SCREEN   CBC WITH AUTO DIFFERENTIAL   COMPREHENSIVE METABOLIC PANEL W/ REFLEX TO MG FOR LOW K   TSH WITH REFLEX   ETHANOL   SALICYLATE LEVEL   ACETAMINOPHEN LEVEL   TROPONIN       EMERGENCY DEPARTMENT COURSE:   Vitals:    Vitals:    02/10/22 1851 02/10/22 1852   BP:  (!) 149/97   Pulse:  81   Resp:  18   Temp: 98.2 °F (36.8 °C) 98.2 °F (36.8 °C)   TempSrc: Oral Oral   SpO2:  100%   Weight:  220 lb (99.8 kg)   Height:  5' 10\" (1.778 m)      7:25 PM EST: The patient was seen and evaluated. Patient presents for evaluation for suicidal thoughts have been ongoing for the past 2 days. He denied any plan or intent and denied any homicidal ideation or hallucinations. He did report some chest discomfort prior to arrival that he described as a tightness, associated with anxiety.   No personal family history of cardiac disease. Urinalysis and chest x-ray were unremarkable. EKG without any acute ischemic changes. He was signed out at end of shift pending remainder of laboratory studies and medical clearance. CRITICAL CARE:   None    CONSULTS:  ITA    PROCEDURES:  None    FINAL IMPRESSION      1. Depression with suicidal ideation          DISPOSITION/PLAN   Please see follow-up provider note for final disposition    PATIENT REFERRED TO:  No follow-up provider specified.     DISCHARGEMEDICATIONS:  New Prescriptions    No medications on file       (Please note that portions of this note were completedwith a voice recognition program.  Efforts were made to edit the dictations but occasionally words are mis-transcribed.)        Nataliia Spencer PA-C  02/10/22 6676

## 2022-02-11 NOTE — ED NOTES
Performed EKG. Pt states he still has slight chest pain. Says it's not as bad as when he first came in. No other complaints at this time. Pt in room with wife speaking to social work.       Andrea Garcia  02/10/22 1943

## 2022-02-11 NOTE — PROGRESS NOTES
Chief Complaint:   Suicidal       Provisional Diagnosis: Major Depressive Disorder Recurrent without Psychotic Features      Risk, Psychosocial and Contextual Factors: (homeless, lack of social support etc.): Not med compliant. Death of family pet. Current  Treatment: Dr. Filomena Shelley        Present Suicidal Behavior:    Verbal: xxxx    Attempt: Denies      Access to Weapons: Denies      C-SSRS Current Suicide Risk: Low, Moderate or High:  Moderate       Past Suicidal Behavior:    Verbal: Denies    Attempts: Denies      Self-Injurious/Self-Mutilation: (Specify) Denies      Traumatic Event Within Past 2 Weeks: (Specify) Death of his dog. Female student attempted suicide by cutting her wrist.       Current Abuse:  (Specify)  football , parent continues to post negative remarks of Facebook. Legal: (Specify) Denies      Violence: (Specify) Denies      Protective Factors: Active outpatient care for mental health      Housing: Stable housing with his wife and three children. CPAP/Oxygen/Ambulation Difficulties: na        Risk Factors:  Increase in stress,       Clinical Summary:   Patient is a [de-identified] year old male transported by his wife Tyler Jansen. Patient has three children ages 10, 3, and 3. Patient has been  for eight and a half years. 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 Patient has a history of depression. He has current treatment with Dr. Filomena Shelley in [place]. Patient is prescribed Lexapro and Seroquel. Patient had stopped taking his Seroquel in December. Patient reports a father of one of his players committed suicide this football season. Patient reports he was very supportive of this student . The staff at school were upset with patient as he had spent time with the player and assiting with family needs. Patient reports another student came back to school after cutting her wrist this past week. Patient lost his family pet this past week.  Patient reports he just tries to get through the day. Patient reports suicidal thoughts but denies a specific plan. Patient is accompanied by his parents Xiomara Benitez and Jean) who provide information that patient 'would not kill himself'. 'He has just been very tired'. Level of Care Disposition:      Consulted with Shivani Sainz PA-C concerning the mental status of patient. Consulted with patients RN about abnormalities or medical concerns. Consulted with Hieu Garcia CNP concerning the mental status of patient. Consulted with Dr. Peewee Kruger concerning the mental status of patient . Patient is referred to his current provided for mental health treatment. Patient is also encouraged to seek therapy. Patient is in agreement with plan of care.

## 2022-02-11 NOTE — ED NOTES
Pt appears to be comfortable, resting with eyes open on cot. Pt remains alert and oriented, respirations are equal and unlabored. Pt remains under continuous supervision in the safe rooms.   Call light in reach, wife at bedside, will continue to monitor      [facility]  02/10/22 2180

## 2022-09-07 ENCOUNTER — HOSPITAL ENCOUNTER (EMERGENCY)
Age: 27
Discharge: HOME | End: 2022-09-07
Attending: EMERGENCY MEDICINE
Payer: COMMERCIAL

## 2022-09-07 VITALS
OXYGEN SATURATION: 98 % | BODY MASS INDEX: 30.06 KG/M2 | WEIGHT: 210 LBS | TEMPERATURE: 97.9 F | HEIGHT: 70 IN | HEART RATE: 70 BPM | RESPIRATION RATE: 16 BRPM | SYSTOLIC BLOOD PRESSURE: 137 MMHG | DIASTOLIC BLOOD PRESSURE: 94 MMHG

## 2022-09-07 DIAGNOSIS — F32.89 OTHER DEPRESSION: Primary | ICD-10-CM

## 2022-09-07 LAB
ALBUMIN SERPL-MCNC: 4.6 G/DL (ref 3.5–5.1)
ALP BLD-CCNC: 77 U/L (ref 38–126)
ALT SERPL-CCNC: 26 U/L (ref 11–66)
AMPHETAMINE+METHAMPHETAMINE URINE SCREEN: NEGATIVE
ANION GAP SERPL CALCULATED.3IONS-SCNC: 10 MEQ/L (ref 8–16)
AST SERPL-CCNC: 19 U/L (ref 5–40)
BARBITURATE QUANTITATIVE URINE: NEGATIVE
BASOPHILS # BLD: 0.3 %
BASOPHILS ABSOLUTE: 0 THOU/MM3 (ref 0–0.1)
BENZODIAZEPINE QUANTITATIVE URINE: NEGATIVE
BILIRUB SERPL-MCNC: 0.3 MG/DL (ref 0.3–1.2)
BUN BLDV-MCNC: 20 MG/DL (ref 7–22)
CALCIUM SERPL-MCNC: 9.6 MG/DL (ref 8.5–10.5)
CANNABINOID QUANTITATIVE URINE: NEGATIVE
CHLORIDE BLD-SCNC: 102 MEQ/L (ref 98–111)
CO2: 25 MEQ/L (ref 23–33)
COCAINE METABOLITE QUANTITATIVE URINE: NEGATIVE
CREAT SERPL-MCNC: 0.9 MG/DL (ref 0.4–1.2)
EOSINOPHIL # BLD: 1.1 %
EOSINOPHILS ABSOLUTE: 0.1 THOU/MM3 (ref 0–0.4)
ERYTHROCYTE [DISTWIDTH] IN BLOOD BY AUTOMATED COUNT: 12.5 % (ref 11.5–14.5)
ERYTHROCYTE [DISTWIDTH] IN BLOOD BY AUTOMATED COUNT: 39 FL (ref 35–45)
ETHYL ALCOHOL, SERUM: < 0.01 %
GFR SERPL CREATININE-BSD FRML MDRD: > 90 ML/MIN/1.73M2
GLUCOSE BLD-MCNC: 93 MG/DL (ref 70–108)
HCT VFR BLD CALC: 44.8 % (ref 42–52)
HEMOGLOBIN: 15.2 GM/DL (ref 14–18)
IMMATURE GRANS (ABS): 0.04 THOU/MM3 (ref 0–0.07)
IMMATURE GRANULOCYTES: 0.4 %
LYMPHOCYTES # BLD: 29.9 %
LYMPHOCYTES ABSOLUTE: 2.8 THOU/MM3 (ref 1–4.8)
MCH RBC QN AUTO: 29.2 PG (ref 26–33)
MCHC RBC AUTO-ENTMCNC: 33.9 GM/DL (ref 32.2–35.5)
MCV RBC AUTO: 86.2 FL (ref 80–94)
MONOCYTES # BLD: 7 %
MONOCYTES ABSOLUTE: 0.7 THOU/MM3 (ref 0.4–1.3)
NUCLEATED RED BLOOD CELLS: 0 /100 WBC
OPIATES, URINE: NEGATIVE
OSMOLALITY CALCULATION: 276.1 MOSMOL/KG (ref 275–300)
OXYCODONE: NEGATIVE
PHENCYCLIDINE QUANTITATIVE URINE: NEGATIVE
PLATELET # BLD: 197 THOU/MM3 (ref 130–400)
PMV BLD AUTO: 9.7 FL (ref 9.4–12.4)
POTASSIUM REFLEX MAGNESIUM: 4.5 MEQ/L (ref 3.5–5.2)
RBC # BLD: 5.2 MILL/MM3 (ref 4.7–6.1)
SEG NEUTROPHILS: 61.3 %
SEGMENTED NEUTROPHILS ABSOLUTE COUNT: 5.7 THOU/MM3 (ref 1.8–7.7)
SODIUM BLD-SCNC: 137 MEQ/L (ref 135–145)
TOTAL PROTEIN: 7.1 G/DL (ref 6.1–8)
WBC # BLD: 9.3 THOU/MM3 (ref 4.8–10.8)

## 2022-09-07 PROCEDURE — 82077 ASSAY SPEC XCP UR&BREATH IA: CPT

## 2022-09-07 PROCEDURE — 36415 COLL VENOUS BLD VENIPUNCTURE: CPT

## 2022-09-07 PROCEDURE — 99285 EMERGENCY DEPT VISIT HI MDM: CPT

## 2022-09-07 PROCEDURE — 85025 COMPLETE CBC W/AUTO DIFF WBC: CPT

## 2022-09-07 PROCEDURE — 80307 DRUG TEST PRSMV CHEM ANLYZR: CPT

## 2022-09-07 PROCEDURE — 80053 COMPREHEN METABOLIC PANEL: CPT

## 2022-09-07 RX ORDER — FLUOXETINE HYDROCHLORIDE 40 MG/1
CAPSULE ORAL
COMMUNITY
Start: 2022-08-29

## 2022-09-07 RX ORDER — ARIPIPRAZOLE 2 MG/1
TABLET ORAL
COMMUNITY
Start: 2022-08-20

## 2022-09-07 RX ORDER — LAMOTRIGINE 100 MG/1
TABLET ORAL
COMMUNITY
Start: 2022-08-20

## 2022-09-07 ASSESSMENT — PAIN - FUNCTIONAL ASSESSMENT: PAIN_FUNCTIONAL_ASSESSMENT: NONE - DENIES PAIN

## 2022-09-07 ASSESSMENT — PATIENT HEALTH QUESTIONNAIRE - PHQ9: SUM OF ALL RESPONSES TO PHQ QUESTIONS 1-9: 27

## 2022-09-07 ASSESSMENT — LIFESTYLE VARIABLES
HOW OFTEN DO YOU HAVE A DRINK CONTAINING ALCOHOL: 2-3 TIMES A WEEK
HOW MANY STANDARD DRINKS CONTAINING ALCOHOL DO YOU HAVE ON A TYPICAL DAY: 5 OR 6

## 2022-09-07 ASSESSMENT — SLEEP AND FATIGUE QUESTIONNAIRES
SLEEP PATTERN: DISTURBED/INTERRUPTED SLEEP
DO YOU USE A SLEEP AID: NO
DO YOU HAVE DIFFICULTY SLEEPING: YES
AVERAGE NUMBER OF SLEEP HOURS: 6

## 2022-09-07 NOTE — LETTER
[facility] [address] [id number] EMERGENCY DEPT  [address] [place] [id number]  Phone: [phone]               September 7, 2022    Patient: Dallin Broaden   YOB: 1995   Date of Visit: 9/7/2022       To Whom It May Concern:    Maxwell Aj was seen and treated in our emergency department on 9/7/2022. He may return to work on 09/08/2022. Sincerely,       Dr Maria Alejandra To.  DO         Signature:__________________________________

## 2022-09-07 NOTE — DISCHARGE INSTRUCTIONS
Follow up as directed by mental health. Return to ER if suicidal, hopeless, or any new symptoms or concerns.

## 2022-09-07 NOTE — ED TRIAGE NOTES
Pt presents to the ED c/o suicidal ideations that have been ongoing for the last several months on and off. Pt states that he does not have a plan. Pt is being treated for anxiety and depression. Pt is alert and oriented, respirations are equal and unlabored.

## 2022-09-07 NOTE — PROGRESS NOTES
Chief Complaint:    Suicidal thoughts       Provisional Diagnosis:  Unspecified Depressive Disorder       Risk, Psychosocial and Contextual Factors: (homeless, lack of social support etc.):  Stressful  job, currently reside with parent and job is an hour commute each way       Current  Treatment:  Dr. [facility]par[facility]        Present Suicidal Behavior:    Verbal: X    Attempt:Denies       Access to Weapons: Pts father has guns in the home, pt is not sure if there is ammunition       C-SSRS Current Suicide Risk: Low, Moderate or High:    Low       Past Suicidal Behavior:    Verbal: X    Attempts: Denies       Self-Injurious/Self-Mutilation: Denies       Traumatic Event Within Past 2 Weeks: Denies       Current Abuse:  Denies       Legal:Denies       Violence: Denies       Protective Factors:   Linked to outpatient psychiatry, wife is supportive       Housing: Pt, pts wife and three children reside with pts parents and sister       CPAP/Oxygen/Ambulation Difficulties: Denies       Basic Vital Signs: See epic       Critical Labs:      Risk Factors:  Suicidal thoughts      Clinical Summary:      Pt is a 32year old male with a self reported  history of anxiety, depression and bipolar disorder escorted to [facility] ED voluntarily by his wife, Lady Kaba, due to suicidal thoughts. Pt presented to [facility] ED in February 2022 with similar behavior and was discharge to follow up with outpatient mental health services. Pt report current suicidal thoughts, no plan, no intent. Pt denies homicidal thought, denies hallucinations, no delusions noted. Precipitating factors including pts current job and living situation. Pt has worked at [employer] in [place], [employer] for two weeks and state the behavior of the children and amount of work is overwhelming. In addition, pt, his wife and three children ages 9, 11 and 1 currently reside with pts parents and his sister in [place] therefore there is an hour commute to and from work. Pt and his wife are currently looking for a home in the [place] area. Pt spend a couple nights a week with a friend in [place] to prevent the lengthy commute. Pt was staying at his friends home in [place], reportedly had a \"breakdown\", called Melanie who drove to [place], picked pt up and drove him to [facility]. Pt is seeking inpatient psychiatric treatment. Pt reportedly is linked to outpatient psychiatric services with Dr. Madhu Evangelista in [place] at [facility] [address]. Pt reportedly has been prescribed lamictal, prozac and abilify by Dr. Madhu Evangelista for a year however was taking it every other day until a week ago when pt decided to take the medication as prescribed. Pt report a loss of sleep and appetite, denies illicit drug use, report alcohol use on the weekends. Pt is alert, oriented x4, impaired judgment, linear thought, good eye contact, cooperative. Level of Care Disposition:      0142  Pt medically cleared by Dr. Domenico Parry. 0144    Call to Dr. Lesvia Sheets, no answer, left hippa compliant message requesting a return call. [address] [facility] who recommend pt follow up with outpatient mental health services. 0746  Dr. Domenico Parry updated.     0222  Pt updated and will follow up with Dr. Madhu Evangelista

## 2022-09-07 NOTE — ED NOTES
Patient placed in safe room that is ligature resistant with continuous monitoring in place. Provider notified, requested an assessment by behavioral health . Patient belongings secured in a locked lockers outside of the room. Explained suicide prevention precautions to the patient including constant observer.         [facility]  09/07/22 6272

## 2022-09-08 NOTE — ED PROVIDER NOTES
family history is not on file. SOCIAL HISTORY      reports that he has never smoked. He has never used smokeless tobacco. He reports that he does not use drugs. PHYSICAL EXAM     INITIAL VITALS:  height is 5' 10\" (1.778 m) and weight is 210 lb (95.3 kg). His oral temperature is 97.9 °F (36.6 °C). His blood pressure is 137/94 (abnormal) and his pulse is 70. His respiration is 16 and oxygen saturation is 98%. Constitutional: Well appearing and non-toxic   Eyes:  Pupils are equal   HENT:  Atraumatic appearing  Neck- supple   Respiratory:  No wheezing, rhonchi or rales  Cardiovascular: regular  GI:  Non tender, no rigidity, rebound or guarding   Musculoskeletal:  2/4 distal pulses, no pitting edema  Integument: warm and dry  Neurologic:  Alert & oriented x 3, steady gait  Psychiatric:  Speech and behavior appropriate        DIAGNOSTIC RESULTS          LABS:   Labs Reviewed   CBC WITH AUTO DIFFERENTIAL   COMPREHENSIVE METABOLIC PANEL W/ REFLEX TO MG FOR LOW K   ETHANOL   URINE DRUG SCREEN   ANION GAP   OSMOLALITY   GLOMERULAR FILTRATION RATE, ESTIMATED       EMERGENCY DEPARTMENT COURSE:   Vitals:    Vitals:    09/07/22 0040   BP: (!) 137/94   Pulse: 70   Resp: 16   Temp: 97.9 °F (36.6 °C)   TempSrc: Oral   SpO2: 98%   Weight: 210 lb (95.3 kg)   Height: 5' 10\" (1.778 m)     The patient was evaluated by our mental health staff and they have discussed the case with the on-call psychiatrist.  They feel that he is okay to proceed with outpatient treatment and that he is not an imminent danger to himself. CRITICAL CARE:   none         FINAL IMPRESSION      1.  Other depression          DISPOSITION/PLAN   discharged    DISCHARGE MEDICATIONS:  Discharge Medication List as of 9/7/2022  2:38 AM          (Please note that portions of this note were completed with a voice recognition program.  Efforts were made to edit the dictations but occasionally words are mis-transcribed.)    DO Noé Umana Carole Childers,   09/08/22 0021

## (undated) DEVICE — GOWN,SIRUS,NON REINFRCD,LARGE,SET IN SL: Brand: MEDLINE

## (undated) DEVICE — Z DUPLICATE USE 2431315 SET INSUF TBNG HI FLO W/ SMK EVAC FOR PNEUMOCLEAR

## (undated) DEVICE — TROCAR: Brand: KII SHIELDED BLADED ACCESS SYSTEM

## (undated) DEVICE — SPONGE GZ W4XL4IN COT 12 PLY TYP VII WVN C FLD DSGN

## (undated) DEVICE — TUBING, SUCTION, 1/4" X 20', STRAIGHT: Brand: MEDLINE INDUSTRIES, INC.

## (undated) DEVICE — GLOVE SURG SZ 7 L12IN FNGR THK94MIL TRNSLUC YEL LTX HYDRGEL

## (undated) DEVICE — GLOVE SURG SZ 65 THK91MIL LTX FREE SYN POLYISOPRENE

## (undated) DEVICE — ADHESIVE SKIN CLSR 0.7ML TOP DERMBND ADV

## (undated) DEVICE — SOLUTION ANTIFOG VIS SYS CLEARIFY LAPSCP

## (undated) DEVICE — CATHETER CHOLGM 4.5FR L18IN TIP 5.5FR W/ MTL SUPP TB TAUT

## (undated) DEVICE — SURGICEL ENDOSCP APPL

## (undated) DEVICE — CATHETER CHOLANGIOGRAPHY 4FR L18IN TBNG 3 W STPCOCK OPN

## (undated) DEVICE — GENERAL LAPAROSCOPY PACK-LF: Brand: MEDLINE INDUSTRIES, INC.

## (undated) DEVICE — BAG SPEC REM 224ML W4XL6IN DIA10MM 1 HND GYN DISP ENDOPCH

## (undated) DEVICE — PENCIL ES L3M BTTN SWCH HOLSTER W/ BLDE ELECTRD EDGE

## (undated) DEVICE — TROCARS: Brand: KII® BLUNT TIP ACCESS SYSTEM

## (undated) DEVICE — INTRODUCER CATH L3.5IN DIA7.5FR FOR CHOLANGIOGRAPHY TAUT

## (undated) DEVICE — TAPE ADH W2INXL10YD PLAS TRNSPAR H2O RESIST HYPOALRG CURAD

## (undated) DEVICE — DISSECTOR LAP DIA5MM BLNT TIP ENDOPATH

## (undated) DEVICE — SUTURE VCRL + SZ 4-0 L27IN ABSRB WHT FS-2 3/8 CIR REV CUT VCP422H

## (undated) DEVICE — KIT,ANTI FOG,W/SPONGE & FLUID,SOFT PACK: Brand: MEDLINE

## (undated) DEVICE — YANKAUER,BULB TIP,W/O VENT,RIGID,STERILE: Brand: MEDLINE

## (undated) DEVICE — PUMP SUC IRR TBNG L10FT W/ HNDPC ASSEMB STRYKEFLOW 2

## (undated) DEVICE — GLOVE ORANGE PI 7 1/2   MSG9075

## (undated) DEVICE — SUTURE VCRL + SZ 0 L27IN ABSRB VLT L26MM UR-6 5/8 CIR VCP603H

## (undated) DEVICE — APPLIER CLP M L L11.4IN DIA10MM ENDOSCP ROT MULT FOR LIG

## (undated) DEVICE — LINER SUCT CANSTR 1500CC SEMI RIG W/ POR HYDROPHOBIC SHUT

## (undated) DEVICE — GLOVE ORANGE PI 7   MSG9070

## (undated) DEVICE — AGENT HEMSTAT 3GM OXIDIZED REGENERATED CELOS ABSRB FOR CONT (ORDER MULTIPLES OF 5EA)